# Patient Record
Sex: FEMALE | Race: AMERICAN INDIAN OR ALASKA NATIVE
[De-identification: names, ages, dates, MRNs, and addresses within clinical notes are randomized per-mention and may not be internally consistent; named-entity substitution may affect disease eponyms.]

---

## 2018-03-15 NOTE — HISTORY AND PHYSICAL REPORT
History of Present Illness


Date of examination: 03/15/18


Date of admission: 


03/15/18 19:59





Chief complaint: 





Chief complaint: Frequent falls today and painful to walk








History of present illness: 


History of present illness:


 82-year-old female with severe Parkinson's disease progressively getting 

weaker.  Unable to walk today.  Fell couple of times.  Patient complains of 

pain in the hips and the legs and the feet.  No shortness of breath.  Patient 

has severe resting tremors in both upper extremities.  No shortness of breath.  

No chest pain.  Multiple falls.  Family wants skilled nursing facility 

admission for rehabilitation and possible long-term stay.  No fever no chills.  

No recent travel.








Past Medical History


Previous Medical History?: Yes


Hx Hypertension: Yes


Additional medical history: parkinsons





Surgical History


Past Surgical History?: Yes


Additional Surgical History: fibroid tumor removal.  hysterectomy





-Social History


Smoking Status: Never Smoker


Substance Use Type: None





Medications


Home Medications: 


 


Review of Systems


ROS: 


Stated complaint: FALL PAINFUL HIPS/KNEES


Other details as noted in HPI





Comment: All other systems reviewed and negative


Constitutional: denies: chills, fever frequent falls


Eyes: denies: eye pain, eye discharge, vision change


ENT: denies: ear pain, throat pain


Respiratory: denies: cough, shortness of breath, wheezing


Cardiovascular: denies: chest pain, palpitations


Endocrine: no symptoms reported


Gastrointestinal: denies: abdominal pain, nausea, diarrhea


Genitourinary: denies: urgency, dysuria, discharge


Musculoskeletal: denies: back pain, joint swelling, arthralgia pain both hips 

knees and feet


Skin: denies: rash, lesions


Neurological: denies: headache, weakness, paresthesias severe rest tremors 

consistent with Parkinson's.


Psychiatric: denies: anxiety, depression


Hematological/Lymphatic: denies: easy bleeding, easy bruising


                    





Medications and Allergies


 Allergies











Allergy/AdvReac Type Severity Reaction Status Date / Time


 


No Known Allergies Allergy   Verified 03/15/18 22:53











 Home Medications











 Medication  Instructions  Recorded  Confirmed  Last Taken  Type


 


Atenolol/Chlorthalidone 1 each PO DAILY 03/15/18 03/15/18 Unknown History





[Atenolol-Chlorthalidone 100-25]     


 


Carbidopa/Levodopa [Carbidopa-Levo 1 tab PO QID 03/15/18 03/15/18 Unknown 

History





 mg Odt]     


 


Potassium Chloride [K-Tab ER] 10 meq PO DAILY 03/15/18 03/15/18 Unknown History


 


Primidone [Mysoline] 125 mg PO BID 03/15/18 03/15/18 Unknown History


 


Rasagiline Mesylate [Azilect] 1 mg PO QAM 03/15/18 03/15/18 Unknown History











Active Meds: 


Active Medications





Oxycodone/Acetaminophen (Percocet 5/325)  1 tab PO Q4H PRN


   PRN Reason: Pain, Moderate (4-6)


   Last Admin: 03/15/18 22:59 Dose:  1 tab











Exam





- Physical Exam


Narrative exam: 


Patient in bed with severe resting tremors.  Very tremulous








- Constitutional


Vitals: 


 











Temp Pulse Resp BP Pulse Ox


 


 97.7 F   65   18   123/43   97 


 


 03/15/18 21:51  03/15/18 21:51  03/15/18 14:54  03/15/18 21:51  03/15/18 21:51











General appearance: Present: no acute distress, mild distress, well-nourished





- EENT


Eyes: Present: PERRL


ENT: hearing intact, clear oral mucosa





- Neck


Neck: Present: supple, normal ROM





- Respiratory


Respiratory effort: normal


Respiratory: bilateral: CTA





- Cardiovascular


Heart rate: 80


Rhythm: regular (80)


Heart Sounds: Present: S1 & S2.  Absent: rub, click





- Extremities


Extremities: pulses symmetrical, No edema


Peripheral Pulses: within normal limits





- Abdominal


General gastrointestinal: Present: soft, non-tender, non-distended, normal 

bowel sounds


Female genitourinary: Present: normal





- Rectal


Rectal Exam: deferred





- Integumentary


Integumentary: Present: clear, warm, dry





- Musculoskeletal


Musculoskeletal: strength equal bilaterally, generalized weakness, other (

severe resting tremors)





- Psychiatric


Psychiatric: appropriate mood/affect, intact judgment & insight





- Neurologic


Neurologic: CNII-XII intact, moves all extremities, other (severe resting 

tremors.  Unable to walk)





- Allied Health


Allied health notes reviewed: nursing, case management





Results





- Labs


CBC & Chem 7: 


 03/15/18 15:50





 03/15/18 15:50


Labs: 


 Laboratory Last Values











WBC  11.4 K/mm3 (4.5-11.0)  H  03/15/18  15:50    


 


RBC  4.24 M/mm3 (3.65-5.03)   03/15/18  15:50    


 


Hgb  12.2 gm/dl (10.1-14.3)   03/15/18  15:50    


 


Hct  36.8 % (30.3-42.9)   03/15/18  15:50    


 


MCV  87 fl (79-97)   03/15/18  15:50    


 


MCH  29 pg (28-32)   03/15/18  15:50    


 


MCHC  33 % (30-34)   03/15/18  15:50    


 


RDW  14.2 % (13.2-15.2)   03/15/18  15:50    


 


Plt Count  254 K/mm3 (140-440)   03/15/18  15:50    


 


Lymph % (Auto)  11.3 % (13.4-35.0)  L  03/15/18  15:50    


 


Mono % (Auto)  9.9 % (0.0-7.3)  H  03/15/18  15:50    


 


Eos % (Auto)  2.2 % (0.0-4.3)   03/15/18  15:50    


 


Baso % (Auto)  0.7 % (0.0-1.8)   03/15/18  15:50    


 


Lymph #  1.3 K/mm3 (1.2-5.4)   03/15/18  15:50    


 


Mono #  1.1 K/mm3 (0.0-0.8)  H  03/15/18  15:50    


 


Eos #  0.3 K/mm3 (0.0-0.4)   03/15/18  15:50    


 


Baso #  0.1 K/mm3 (0.0-0.1)   03/15/18  15:50    


 


Seg Neutrophils %  75.9 % (40.0-70.0)  H  03/15/18  15:50    


 


Seg Neutrophils #  8.7 K/mm3 (1.8-7.7)  H  03/15/18  15:50    


 


PT  12.8 Sec. (12.2-14.9)   03/15/18  15:50    


 


INR  0.92  (0.87-1.13)   03/15/18  15:50    


 


APTT  29.2 Sec. (24.2-36.6)   03/15/18  15:50    


 


Sodium  135 mmol/L (137-145)  L  03/15/18  15:50    


 


Potassium  3.8 mmol/L (3.6-5.0)   03/15/18  15:50    


 


Chloride  91.5 mmol/L ()  L  03/15/18  15:50    


 


Carbon Dioxide  27 mmol/L (22-30)   03/15/18  15:50    


 


Anion Gap  20 mmol/L  03/15/18  15:50    


 


BUN  15 mg/dL (7-17)   03/15/18  15:50    


 


Creatinine  0.5 mg/dL (0.7-1.2)  L  03/15/18  15:50    


 


Estimated GFR  > 60 ml/min  03/15/18  15:50    


 


BUN/Creatinine Ratio  30 %  03/15/18  15:50    


 


Glucose  96 mg/dL ()   03/15/18  15:50    


 


Calcium  9.1 mg/dL (8.4-10.2)   03/15/18  15:50    


 


Total Bilirubin  0.30 mg/dL (0.1-1.2)   03/15/18  15:50    


 


Direct Bilirubin  < 0.2 mg/dL (0-0.2)   03/15/18  15:50    


 


Indirect Bilirubin  0.1 mg/dL  03/15/18  15:50    


 


AST  30 units/L (5-40)   03/15/18  15:50    


 


ALT  12 units/L (7-56)   03/15/18  15:50    


 


Alkaline Phosphatase  101 units/L ()   03/15/18  15:50    


 


Troponin T  0.036 ng/mL (0.00-0.029)  H D 03/15/18  22:53    


 


Total Protein  6.9 g/dL (6.3-8.2)   03/15/18  15:50    


 


Albumin  4.1 g/dL (3.9-5)   03/15/18  15:50    


 


Albumin/Globulin Ratio  1.5 %  03/15/18  15:50    


 


Triglycerides  62 mg/dL (2-149)   03/15/18  15:50    


 


Cholesterol  222 mg/dL ()  H  03/15/18  15:50    


 


LDL Cholesterol Direct  79 mg/dL ()   03/15/18  15:50    


 


HDL Cholesterol  153 mg/dL (40-59)  H  03/15/18  15:50    


 


Cholesterol/HDL Ratio  1.45 %  03/15/18  15:50    


 


Urine Color  Yellow  (Yellow)   03/15/18  21:11    


 


Urine Turbidity  Clear  (Clear)   03/15/18  21:11    


 


Urine pH  7.0  (5.0-7.0)   03/15/18  21:11    


 


Ur Specific Gravity  1.018  (1.003-1.030)   03/15/18  21:11    


 


Urine Protein  <15 mg/dl mg/dL (Negative)   03/15/18  21:11    


 


Urine Glucose (UA)  Neg mg/dL (Negative)   03/15/18  21:11    


 


Urine Ketones  Neg mg/dL (Negative)   03/15/18  21:11    


 


Urine Blood  Sm  (Negative)   03/15/18  21:11    


 


Urine Nitrite  Neg  (Negative)   03/15/18  21:11    


 


Urine Bilirubin  Neg  (Negative)   03/15/18  21:11    


 


Urine Urobilinogen  < 2.0 mg/dL (<2.0)   03/15/18  21:11    


 


Ur Leukocyte Esterase  Sm  (Negative)   03/15/18  21:11    


 


Urine WBC (Auto)  28.0 /HPF (0.0-6.0)  H  03/15/18  21:11    


 


Urine RBC (Auto)  5.0 /HPF (0.0-6.0)   03/15/18  21:11    


 


U Epithel Cells (Auto)  1.0 /HPF (0-13.0)   03/15/18  21:11    


 


Urine Bacteria (Auto)  2+ /HPF (Negative)   03/15/18  21:11    


 


Urine Mucus  Few /HPF  03/15/18  21:11    








 Short CBC











  03/15/18 Range/Units





  15:50 


 


WBC  11.4 H  (4.5-11.0)  K/mm3


 


Hgb  12.2  (10.1-14.3)  gm/dl


 


Hct  36.8  (30.3-42.9)  %


 


Plt Count  254  (140-440)  K/mm3








 BMP











  03/15/18





  15:50


 


Sodium  135 L


 


Potassium  3.8


 


Chloride  91.5 L


 


Carbon Dioxide  27


 


BUN  15


 


Creatinine  0.5 L


 


Glucose  96


 


Calcium  9.1








 Cardiac Enzymes











  03/15/18 03/15/18 03/15/18 Range/Units





  15:50 17:45 22:53 


 


Troponin T  0.059 H  0.055 H  0.036 H D  (0.00-0.029)  ng/mL








 Liver Function











  03/15/18 Range/Units





  15:50 


 


Total Bilirubin  0.30  (0.1-1.2)  mg/dL


 


Direct Bilirubin  < 0.2  (0-0.2)  mg/dL


 


AST  30  (5-40)  units/L


 


ALT  12  (7-56)  units/L


 


Alkaline Phosphatase  101  ()  units/L


 


Albumin  4.1  (3.9-5)  g/dL








 Urine











  03/15/18 Range/Units





  21:11 


 


Urine Color  Yellow  (Yellow)  


 


Urine pH  7.0  (5.0-7.0)  


 


Ur Specific Gravity  1.018  (1.003-1.030)  


 


Urine Protein  <15 mg/dl  (Negative)  mg/dL


 


Urine Glucose (UA)  Neg  (Negative)  mg/dL














- Imaging and Cardiology


EKG: report reviewed


Imaging and Cardiology: 


X-ray of the knee


Right fracture and slight displacement of the fibular neck





X-ray of the knee


osteopenia and degenerative changes of the knees


Fracture of the right fibular neck with remodeling which may be subacute or 

chronic.


Prepatellar soft tissue swelling and moderate joint effusions





X-ray of the toes


Posttraumatic fracture of proximal phalanx of the fifth digit right foot


Cannot exclude nondisplaced fracture of the fifth metatarsal neck








Assessment and Plan


Advance Directives: Yes (full code)


VTE prophylaxis?: Chemical


Plan of care discussed with patient/family: Yes





- Patient Problems


(1) Right fibular fracture


Current Visit: Yes   Status: Acute   


Qualifiers: 


   Encounter type: initial encounter   Fracture morphology: spiral 


Plan to address problem: 


Orthopedic consult requested.  Physical therapy if orthopedic clears.  Patient 

has frequent falls.  Needs skilled nursing facility placement








(2) Falls


Current Visit: Yes   Status: Acute   


Qualifiers: 


   Encounter type: initial encounter   Qualified Code(s): W19.XXXA - 

Unspecified fall, initial encounter   


Plan to address problem: 


Frequent falls secondary to right fibular fracture and right foot fracture








(3) Parkinson's disease


Current Visit: Yes   Status: Acute   


Plan to address problem: 


Severe Parkinson's.  Continue antiparkinson drugs








(4) Discharge planning issues


Current Visit: Yes   Status: Acute   


Plan to address problem: 


Patient needs skilled nursing facility placement.  Case 








(5) DVT prophylaxis


Current Visit: Yes   Status: Acute   


Plan to address problem: 


heparin

## 2018-03-15 NOTE — XRAY REPORT
FINAL REPORT



PROCEDURE:  XR KNEE BILAT 1-2V



TECHNIQUE:  Bilateral knee radiographs, AP and lateral views.



HISTORY:  Fall. Bilateral knee pain. 



COMPARISON:  No prior studies are available for comparison.



FINDINGS:  

Right 



Fracture (s) and/or Dislocation(s): There is bony remodeling and

slight displacement of the fibular neck.



Joint space(s): Mild lateral tibial translation. Moderate to

severe patellofemoral compartment narrowing and osteophytes. Mild

to moderate medial and lateral compartment narrowing and

osteophytes. Moderate joint effusion. 



Soft tissues: Mild prepatellar soft tissue swelling. 13 mm round

amorphous calcification about the posterior joint line. Vascular

calcification. 



Bone mineralization: Normal. 



Foreign bodies: None. 



Left 



Fracture (s) and/or Dislocation(s): Slight

irregularity/remodeling of the fibular neck. 



Joint space(s): Slight lateral tibial translation. Moderate to

severe medial and patellofemoral compartment narrowing and

osteophytes. Moderate lateral patellofemoral compartment

narrowing and osteophytes. Slight genu valgum. Moderate joint

effusion. 



Soft tissues: Mild prepatellar soft tissue swelling. Vascular

calcification. 



Bone mineralization: Osteopenia 



Foreign bodies: None. 



IMPRESSION:  

Osteopenia and degenerative changes of the knees. There is a

fracture of the right fibular neck, with remodeling may be

subacute/chronic. Similar for the to a lesser degree in the left

fibular neck, could be positioning but also consider age

indeterminate subtle fibular neck fracture. 



Prepatellar soft tissue swelling and moderate joint effusions

bilaterally. 



Amorphous calcification about the posterior joint line, consider

could be flabella. Also consider large intra-articular loose

body, or calcified cystic lesion.

## 2018-03-15 NOTE — XRAY REPORT
FINAL REPORT



PROCEDURE:  XR TOE(S) 2+V RT



TECHNIQUE:  RIGHT 5th toe radiographs, including AP, oblique and

lateral views.



HISTORY:  Right 5th toe pain. 



COMPARISON:  No prior studies are available for comparison.



FINDINGS:  

Fracture(s) and/or Dislocation(s): Transverse lucency through the

proximal phalanx of the 5th digit. Minimal angulation and

irregularity at the neck of the 5th metatarsal. 



Joint space(s): Flexion of the 2nd-5th digits, particularly 4th

and 5th digits, limits evaluation. Mild diffuse narrowing of the

distal interphalangeal joints, 1st metatarsophalangeal joint, and

the intertarsal joints. 



Soft tissues: Normal.



Bone mineralization: Moderate osteopenia.



Foreign bodies: None.



IMPRESSION:  

Moderate osteopenia and flexion of the digits limits evaluation.

Findings concerning for minimally displaced posttraumatic

fracture of the proximal phalanx of the 5th digit. Cannot exclude

nondisplaced fracture of the 5th metatarsal neck.

## 2018-03-15 NOTE — EMERGENCY DEPARTMENT REPORT
- General


Chief complaint: Fall


Stated complaint: FALL PAINFUL HIPS/KNEES


Time Seen by Provider: 03/15/18 16:30


Source: patient, family


Mode of arrival: Wheelchair


Limitations: No Limitations





- Related Data


 Home Medications











 Medication  Instructions  Recorded  Confirmed  Last Taken


 


No Known Home Medications [No  03/10/14 03/10/14 Unknown





Reported Home Medications]    











 Allergies











Allergy/AdvReac Type Severity Reaction Status Date / Time


 


No Known Allergies Allergy   Unverified 03/10/14 14:52














ED Review of Systems


ROS: 


Stated complaint: FALL PAINFUL HIPS/KNEES


Other details as noted in HPI





Comment: All other systems reviewed and negative


Constitutional: denies: chills, fever


Eyes: denies: eye pain, eye discharge, vision change


ENT: denies: ear pain, throat pain


Respiratory: denies: cough, shortness of breath, wheezing


Cardiovascular: denies: chest pain, palpitations


Endocrine: no symptoms reported


Gastrointestinal: denies: abdominal pain, nausea, diarrhea


Genitourinary: denies: urgency, dysuria, discharge


Musculoskeletal: denies: back pain, joint swelling, arthralgia


Skin: denies: rash, lesions


Neurological: denies: headache, weakness, paresthesias


Psychiatric: denies: anxiety, depression


Hematological/Lymphatic: denies: easy bleeding, easy bruising





ED Past Medical Hx





- Past Medical History


Previous Medical History?: Yes


Hx Hypertension: Yes


Additional medical history: parkinsons





- Surgical History


Past Surgical History?: Yes


Additional Surgical History: fibroid tumor removal.  hysterectomy





- Social History


Smoking Status: Never Smoker


Substance Use Type: None





- Medications


Home Medications: 


 Home Medications











 Medication  Instructions  Recorded  Confirmed  Last Taken  Type


 


No Known Home Medications [No  03/10/14 03/10/14 Unknown History





Reported Home Medications]     














ED Physical Exam





- General


Limitations: No Limitations


General appearance: alert, in no apparent distress





- Head


Head exam: Present: atraumatic, normocephalic





- Eye


Eye exam: Present: normal appearance





- ENT


ENT exam: Present: mucous membranes moist





- Neck


Neck exam: Present: normal inspection





- Respiratory


Respiratory exam: Present: normal lung sounds bilaterally.  Absent: respiratory 

distress





- Cardiovascular


Cardiovascular Exam: Present: regular rate, normal rhythm.  Absent: systolic 

murmur, diastolic murmur, rubs, gallop





- GI/Abdominal


GI/Abdominal exam: Present: soft, normal bowel sounds





- Extremities Exam


Extremities exam: Present: tenderness (bilateral lower extremity tenderness), 

pedal edema, joint swelling





- Back Exam


Back exam: Present: normal inspection





- Neurological Exam


Neurological exam: Present: alert, oriented X3





- Psychiatric


Psychiatric exam: Present: normal affect, normal mood





- Skin


Skin exam: Present: warm, dry, intact, normal color.  Absent: rash





ED Course


 Vital Signs











  03/15/18 03/15/18 03/15/18





  14:54 19:32 19:45


 


Temperature 98.1 F 98.0 F 


 


Pulse Rate 60  


 


Respiratory 18  





Rate   


 


Blood Pressure 110/47  


 


Blood Pressure   130/56





[Left]   


 


O2 Sat by Pulse 99  





Oximetry   














- Reevaluation(s)


Reevaluation #1: 


Hospitalist agreed to admit.


03/15/18 18:58








ED Medical Decision Making





- Lab Data


Result diagrams: 


 03/15/18 15:50





 03/15/18 15:50





- EKG Data


Interpretation: other (EKG with much artifact due to the patient having history 

of Parkinson's disease)





- Medical Decision Making


All results reviewed with patient and family.  Will admit patient for further 

evaluation and treatment.  Hospitalist consulted and agreed to admit








- Differential Diagnosis


falls. fractures. weakness. 


Critical care attestation.: 


If time is entered above; I have spent that time in minutes in the direct care 

of this critically ill patient, excluding procedure time.








ED Disposition


Clinical Impression: 


 Weakness, Left fibular fracture, Right fibular fracture, Falls





Disposition: [id number] OP ADMIT IP TO THIS HOSP


Is pt being admited?: Yes


Does the pt Need Aspirin: No


Condition: Serious


Time of Disposition: 18:53

## 2018-03-16 NOTE — PROGRESS NOTE
Assessment and Plan


Assessment and plan: 


82-year-old female with severe Parkinson's disease progressively getting 

weaker.  Unable to walk today.  Fell couple of times.  Patient complains of 

pain in the hips and the legs and the feet.  No shortness of breath.  Patient 

has severe resting tremors in both upper extremities.  No shortness of breath.  

No chest pain.  Multiple falls.  Family wants skilled nursing facility 

admission for rehabilitation and possible long-term stay.  No fever no chills.  

No recent travel.








Right fibular fracture secondary to fall


Presumed osteoporosis


Falls


Parkinson disease


HTN


Acute cystitis


Advanced Age


Tropenemia- no chest pain.





Plan


* Ortho consulted, PT/OT following ortho eval, Noted frequent falls, may need 

SNF, case management to assist


* Start  calcium and vitamin D


* Continue chlorothalidone


* Await cardiology consultation


* I'll start the patient on antibiotics and obtain urine culture no evidence of 

sepsis at this time


* Fall precautions while in house


* Continue anti-Parkinson medications.


* DVT and GI prophylaxis














History


Interval history: 


Patient seen and examined, in no acute distress. tolerating diet. 








Hospitalist Physical





- Physical exam


Narrative exam: 





General appearance: Present: no acute distress, mild distress, well-nourished, 

resting tremor





- EENT


Eyes: Present: PERRL


ENT: hearing intact, clear oral mucosa





- Neck


Neck: Present: supple, normal ROM





- Respiratory


Respiratory effort: normal


Respiratory: bilateral: CTA





- Cardiovascular


Heart rate: 80


Rhythm: regular (80)


Heart Sounds: Present: S1 & S2.  Absent: rub, click





- Extremities


Extremities: pulses symmetrical, No edema


Peripheral Pulses: within normal limits





- Abdominal


General gastrointestinal: Present: soft, non-tender, non-distended, normal 

bowel sounds


Female genitourinary: Present: normal





- Rectal


Rectal Exam: deferred





- Integumentary


Integumentary: Present: clear, warm, dry





- Musculoskeletal


Musculoskeletal: strength equal bilaterally, generalized weakness, other (

severe resting tremors)





- Psychiatric


Psychiatric: appropriate mood/affect, intact judgment & insight





- Neurologic


Neurologic: CNII-XII intact, moves all extremities, other (severe resting 

tremors.  Unable to walk)





- Allied Health


Allied health notes reviewed: nursing, case management








- Constitutional


Vitals: 


 











Temp Pulse Resp BP Pulse Ox


 


 98.6 F   71   18   130/81   98 


 


 03/16/18 05:46  03/16/18 05:46  03/15/18 14:54  03/16/18 05:46  03/16/18 05:46











General appearance: Present: no acute distress, mild distress, well-nourished





Results





- Labs


CBC & Chem 7: 


 03/15/18 15:50





 03/15/18 15:50


Labs: 


 Laboratory Last Values











WBC  11.4 K/mm3 (4.5-11.0)  H  03/15/18  15:50    


 


RBC  4.24 M/mm3 (3.65-5.03)   03/15/18  15:50    


 


Hgb  12.2 gm/dl (10.1-14.3)   03/15/18  15:50    


 


Hct  36.8 % (30.3-42.9)   03/15/18  15:50    


 


MCV  87 fl (79-97)   03/15/18  15:50    


 


MCH  29 pg (28-32)   03/15/18  15:50    


 


MCHC  33 % (30-34)   03/15/18  15:50    


 


RDW  14.2 % (13.2-15.2)   03/15/18  15:50    


 


Plt Count  254 K/mm3 (140-440)   03/15/18  15:50    


 


Lymph % (Auto)  11.3 % (13.4-35.0)  L  03/15/18  15:50    


 


Mono % (Auto)  9.9 % (0.0-7.3)  H  03/15/18  15:50    


 


Eos % (Auto)  2.2 % (0.0-4.3)   03/15/18  15:50    


 


Baso % (Auto)  0.7 % (0.0-1.8)   03/15/18  15:50    


 


Lymph #  1.3 K/mm3 (1.2-5.4)   03/15/18  15:50    


 


Mono #  1.1 K/mm3 (0.0-0.8)  H  03/15/18  15:50    


 


Eos #  0.3 K/mm3 (0.0-0.4)   03/15/18  15:50    


 


Baso #  0.1 K/mm3 (0.0-0.1)   03/15/18  15:50    


 


Seg Neutrophils %  75.9 % (40.0-70.0)  H  03/15/18  15:50    


 


Seg Neutrophils #  8.7 K/mm3 (1.8-7.7)  H  03/15/18  15:50    


 


PT  12.8 Sec. (12.2-14.9)   03/15/18  15:50    


 


INR  0.92  (0.87-1.13)   03/15/18  15:50    


 


APTT  29.2 Sec. (24.2-36.6)   03/15/18  15:50    


 


Sodium  135 mmol/L (137-145)  L  03/15/18  15:50    


 


Potassium  3.8 mmol/L (3.6-5.0)   03/15/18  15:50    


 


Chloride  91.5 mmol/L ()  L  03/15/18  15:50    


 


Carbon Dioxide  27 mmol/L (22-30)   03/15/18  15:50    


 


Anion Gap  20 mmol/L  03/15/18  15:50    


 


BUN  15 mg/dL (7-17)   03/15/18  15:50    


 


Creatinine  0.5 mg/dL (0.7-1.2)  L  03/15/18  15:50    


 


Estimated GFR  > 60 ml/min  03/15/18  15:50    


 


BUN/Creatinine Ratio  30 %  03/15/18  15:50    


 


Glucose  96 mg/dL ()   03/15/18  15:50    


 


Calcium  9.1 mg/dL (8.4-10.2)   03/15/18  15:50    


 


Total Bilirubin  0.30 mg/dL (0.1-1.2)   03/15/18  15:50    


 


Direct Bilirubin  < 0.2 mg/dL (0-0.2)   03/15/18  15:50    


 


Indirect Bilirubin  0.1 mg/dL  03/15/18  15:50    


 


AST  30 units/L (5-40)   03/15/18  15:50    


 


ALT  12 units/L (7-56)   03/15/18  15:50    


 


Alkaline Phosphatase  101 units/L ()   03/15/18  15:50    


 


Troponin T  0.036 ng/mL (0.00-0.029)  H D 03/15/18  22:53    


 


Total Protein  6.9 g/dL (6.3-8.2)   03/15/18  15:50    


 


Albumin  4.1 g/dL (3.9-5)   03/15/18  15:50    


 


Albumin/Globulin Ratio  1.5 %  03/15/18  15:50    


 


Triglycerides  62 mg/dL (2-149)   03/15/18  15:50    


 


Cholesterol  222 mg/dL ()  H  03/15/18  15:50    


 


LDL Cholesterol Direct  79 mg/dL ()   03/15/18  15:50    


 


HDL Cholesterol  153 mg/dL (40-59)  H  03/15/18  15:50    


 


Cholesterol/HDL Ratio  1.45 %  03/15/18  15:50    


 


Urine Color  Yellow  (Yellow)   03/15/18  21:11    


 


Urine Turbidity  Clear  (Clear)   03/15/18  21:11    


 


Urine pH  7.0  (5.0-7.0)   03/15/18  21:11    


 


Ur Specific Gravity  1.018  (1.003-1.030)   03/15/18  21:11    


 


Urine Protein  <15 mg/dl mg/dL (Negative)   03/15/18  21:11    


 


Urine Glucose (UA)  Neg mg/dL (Negative)   03/15/18  21:11    


 


Urine Ketones  Neg mg/dL (Negative)   03/15/18  21:11    


 


Urine Blood  Sm  (Negative)   03/15/18  21:11    


 


Urine Nitrite  Neg  (Negative)   03/15/18  21:11    


 


Urine Bilirubin  Neg  (Negative)   03/15/18  21:11    


 


Urine Urobilinogen  < 2.0 mg/dL (<2.0)   03/15/18  21:11    


 


Ur Leukocyte Esterase  Sm  (Negative)   03/15/18  21:11    


 


Urine WBC (Auto)  28.0 /HPF (0.0-6.0)  H  03/15/18  21:11    


 


Urine RBC (Auto)  5.0 /HPF (0.0-6.0)   03/15/18  21:11    


 


U Epithel Cells (Auto)  1.0 /HPF (0-13.0)   03/15/18  21:11    


 


Urine Bacteria (Auto)  2+ /HPF (Negative)   03/15/18  21:11    


 


Urine Mucus  Few /HPF  03/15/18  21:11

## 2018-03-16 NOTE — CAT SCAN REPORT
FINAL REPORT



EXAM:  CT HEAD/BRAIN WO CON



HISTORY:  weakness 



TECHNIQUE:  



Routine axial imaging was obtained of the brain without IV

contrast



FINDINGS:  





There is mild generalized volume loss. There is no evidence of

acute stroke or hemorrhage. The ventricular system is appropriate

in size and is symmetric. The basal cisterns appear normal. The

visualized sinuses are clear. The mastoid air cells are well

pneumatized 



IMPRESSION:  

 Age related volume loss. No evidence of acute stroke or

hemorrhage

## 2018-03-16 NOTE — CONSULTATION
History of Present Illness


Consult date: 03/16/18


Consult reason: elevated troponin


History of present illness: 





This is an 82yr old woman who presented following a fall. Patient has a history 

of Parkinson's disease and uses a walker. On yesterday, patient she got up, 

lost her balance and fell. Patient denies chest pain, shortness of breath, 

dizziness and palpitations. Patient denies syncope. 





A cardiac consultation was requested for elevated troponin of 0.059. 12 lead ECG

, non-diagnostic due to motion artifact. Telemetry strips shows a sinus rhythm 

with occasional PVCs. 





Medications and Allergies


 Allergies











Allergy/AdvReac Type Severity Reaction Status Date / Time


 


No Known Allergies Allergy   Verified 03/15/18 22:53











 Home Medications











 Medication  Instructions  Recorded  Confirmed  Last Taken  Type


 


Atenolol/Chlorthalidone 1 each PO DAILY 03/15/18 03/15/18 Unknown History





[Atenolol-Chlorthalidone 100-25]     


 


Carbidopa/Levodopa [Carbidopa-Levo 1 tab PO QID 03/15/18 03/15/18 Unknown 

History





 mg Odt]     


 


Potassium Chloride [K-Tab ER] 10 meq PO DAILY 03/15/18 03/15/18 Unknown History


 


Primidone [Mysoline] 125 mg PO BID 03/15/18 03/15/18 Unknown History


 


Rasagiline Mesylate [Azilect] 1 mg PO QAM 03/15/18 03/15/18 Unknown History











Active Meds: 


Active Medications





Atenolol (Tenormin)  100 mg PO QDAY [facility]


Carbidopa/Levodopa (Sinemet)  1 each PO QID [facility]


Chlorthalidone (Thalitone)  25 mg PO QDAY [facility]


Hydromorphone HCl (Dilaudid)  0.25 mg IV Q6H PRN


   PRN Reason: Pain , Severe (7-10)


   Last Admin: 03/16/18 09:21 Dose:  0.25 mg


Ceftriaxone Sodium 1 gm/ (Sodium Chloride)  20 mls @ 20 mls/10 min IV Q24HR [facility]

; Protocol


Miscellaneous Medication (Rasagiline Mesylate [Azilect])  1 mg PO QAM [facility]


Multivitamins/Minerals (Caltrate Plus)  1 each PO BID [facility]


Oxycodone/Acetaminophen (Percocet 5/325)  1 tab PO Q4H PRN


   PRN Reason: Pain, Moderate (4-6)


   Last Admin: 03/16/18 05:57 Dose:  1 tab


Potassium Chloride (K-Dur)  10 meq PO QDAY [facility]


Primidone (Mysoline)  125 mg PO BID [facility]


   Last Admin: 03/16/18 00:57 Dose:  125 mg











Physical Examination


 Vital Signs











Temp Pulse Resp BP Pulse Ox


 


 98.1 F   60   18   110/47   99 


 


 03/15/18 14:54  03/15/18 14:54  03/15/18 14:54  03/15/18 14:54  03/15/18 14:54











General appearance: no acute distress


HEENT: Positive: PERRL


Cardiac: Positive: Reg Rate and Rhythm


Lungs: Positive: Decreased Breath Sounds


Neuro: Positive: Weakness





Results





 03/15/18 15:50





 03/15/18 15:50


 Cardiac Enzymes











  03/15/18 Range/Units





  15:50 


 


AST  30  (5-40)  units/L








 Coagulation











  03/15/18 Range/Units





  15:50 


 


PT  12.8  (12.2-14.9)  Sec.


 


INR  0.92  (0.87-1.13)  


 


APTT  29.2  (24.2-36.6)  Sec.








 Lipids











  03/15/18 Range/Units





  15:50 


 


Triglycerides  62  (2-149)  mg/dL


 


Cholesterol  222 H  ()  mg/dL


 


HDL Cholesterol  153 H  (40-59)  mg/dL


 


Cholesterol/HDL Ratio  1.45  %








 CBC











  03/15/18 Range/Units





  15:50 


 


WBC  11.4 H  (4.5-11.0)  K/mm3


 


RBC  4.24  (3.65-5.03)  M/mm3


 


Hgb  12.2  (10.1-14.3)  gm/dl


 


Hct  36.8  (30.3-42.9)  %


 


Plt Count  254  (140-440)  K/mm3


 


Lymph #  1.3  (1.2-5.4)  K/mm3


 


Mono #  1.1 H  (0.0-0.8)  K/mm3


 


Eos #  0.3  (0.0-0.4)  K/mm3


 


Baso #  0.1  (0.0-0.1)  K/mm3








 Comprehensive Metabolic Panel











  03/15/18 03/15/18 Range/Units





  15:50 15:50 


 


Sodium  135 L   (137-145)  mmol/L


 


Potassium  3.8   (3.6-5.0)  mmol/L


 


Chloride  91.5 L   ()  mmol/L


 


Carbon Dioxide  27   (22-30)  mmol/L


 


BUN  15   (7-17)  mg/dL


 


Creatinine  0.5 L   (0.7-1.2)  mg/dL


 


Glucose  96   ()  mg/dL


 


Calcium  9.1   (8.4-10.2)  mg/dL


 


Direct Bilirubin   < 0.2  (0-0.2)  mg/dL


 


Indirect Bilirubin   0.1  mg/dL


 


AST   30  (5-40)  units/L


 


ALT   12  (7-56)  units/L


 


Alkaline Phosphatase   101  ()  units/L


 


Total Protein   6.9  (6.3-8.2)  g/dL


 


Albumin   4.1  (3.9-5)  g/dL














Assessment and Plan





s/p Fall


   no acute process on head CT


Parkinson's disease


Hypertension


Mild elevated troponin

## 2018-03-16 NOTE — CONSULTATION
History of Present Illness





- \[facility] [place] [facility]\""


Consult date: 03/16/18


Consult reason: joint pain


History of present illness: 





81 y/o female with hx of Parkinson's, and hx of frequent falls, xrays taken 

upon admission suggestive of fx age undetermined...





Medications and Allergies


 Allergies











Allergy/AdvReac Type Severity Reaction Status Date / Time


 


No Known Allergies Allergy   Verified 03/15/18 22:53











 Home Medications











 Medication  Instructions  Recorded  Confirmed  Last Taken  Type


 


Atenolol/Chlorthalidone 1 each PO DAILY 03/15/18 03/15/18 Unknown History





[Atenolol-Chlorthalidone 100-25]     


 


Carbidopa/Levodopa [Carbidopa-Levo 1 tab PO QID 03/15/18 03/15/18 Unknown 

History





 mg Odt]     


 


Potassium Chloride [K-Tab ER] 10 meq PO DAILY 03/15/18 03/15/18 Unknown History


 


Primidone [Mysoline] 125 mg PO BID 03/15/18 03/15/18 Unknown History


 


Rasagiline Mesylate [Azilect] 1 mg PO QAM 03/15/18 03/15/18 Unknown History











Active Meds: 


Active Medications





Atenolol (Tenormin)  100 mg PO QDAY [facility]


   Last Admin: 03/16/18 12:54 Dose:  Not Given


Carbidopa/Levodopa (Sinemet)  1 each PO QID [facility]


   Last Admin: 03/16/18 12:59 Dose:  1 each


Chlorthalidone (Thalitone)  25 mg PO QDAY [facility]


   Last Admin: 03/16/18 12:52 Dose:  25 mg


Hydromorphone HCl (Dilaudid)  0.25 mg IV Q4H PRN


   PRN Reason: Pain , Severe (7-10)


Ceftriaxone Sodium 1 gm/ (Sodium Chloride)  20 mls @ 20 mls/10 min IV Q24HR [facility]

; Protocol


   Last Admin: 03/16/18 10:24 Dose:  20 mls/10 min


Miscellaneous Medication (Rasagiline Mesylate [Azilect])  1 mg PO QAM [facility]


Multivitamins/Minerals (Caltrate Plus)  1 each PO BID [facility]


   Last Admin: 03/16/18 12:51 Dose:  1 each


Oxycodone/Acetaminophen (Percocet 5/325)  1 tab PO Q4H PRN


   PRN Reason: Pain, Moderate (4-6)


   Last Admin: 03/16/18 12:53 Dose:  1 tab


Potassium Chloride (K-Dur)  10 meq PO QDAY [facility]


   Last Admin: 03/16/18 12:52 Dose:  10 meq


Primidone (Mysoline)  125 mg PO BID [facility]


   Last Admin: 03/16/18 12:51 Dose:  125 mg











Physical Examination





- Physical exam


Narrative exam: 





xrays reviewed and reveal old healed fx's to both proximal fibulas, no recent 

injury detected





Assessment and Plan





bilateral lower extremity pain, no evidence of new injury


recommend PT for gait training

## 2018-03-18 NOTE — PROGRESS NOTE
Assessment and Plan


Assessment and plan: 





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


Presumed osteoporosis


Falls


Parkinson disease


HTN


Acute cystitis


Advanced Age


Tropenemia- no chest pain.





Plan


* Ortho reviewed xrays and reveal old healed fx's to both proximal fibulas, no 

recent injury detected


* Contcalcium and vitamin D


* Continue chlorothalidone


* cont. abx, urine cx neg, f/u blood cx


* Cardiology recommends no further workup--Troponin trend is non-specific  Echo 

08/2017 - normal LVEF


* PT recommends SNF


* Fall precautions while in house


* Continue anti-Parkinson medications.


* DVT and GI prophylaxis


* Await SNF placement





- Patient Problems


(1) Falls


Current Visit: Yes   Status: Acute   


Qualifiers: 


   Encounter type: initial encounter   Qualified Code(s): W19.XXXA - 

Unspecified fall, initial encounter   





(2) Parkinson's disease


Current Visit: Yes   Status: Acute   





(3) Weakness


Current Visit: Yes   Status: Acute   





History


Interval history: 





No new issues overnight.





Hospitalist Physical





- Constitutional


Vitals: 


 











Temp Pulse Resp BP Pulse Ox


 


 98.6 F   74   20   123/52   93 


 


 03/18/18 04:59  03/18/18 09:49  03/18/18 06:47  03/18/18 04:59  03/18/18 04:59











General appearance: Present: no acute distress, well-nourished





- EENT


Eyes: Present: PERRL, EOM intact


ENT: hearing intact, clear oral mucosa, dentition normal





- Neck


Neck: Present: supple, normal ROM





- Respiratory


Respiratory effort: normal


Respiratory: bilateral: CTA





- Cardiovascular


Rhythm: regular


Heart Sounds: Present: S1 & S2.  Absent: gallop, rub





- Extremities


Extremities: no ischemia, No edema, Full ROM





- Abdominal


General gastrointestinal: soft, non-tender, non-distended, normal bowel sounds





- Integumentary


Integumentary: Present: clear, warm, dry





- Neurologic


Neurologic: CNII-XII intact, moves all extremities





Results





- Labs


CBC & Chem 7: 


 03/17/18 03:31





 03/17/18 03:31


Labs: 


 Laboratory Last Values











WBC  7.8 K/mm3 (4.5-11.0)   03/17/18  03:31    


 


RBC  3.80 M/mm3 (3.65-5.03)   03/17/18  03:31    


 


Hgb  11.0 gm/dl (10.1-14.3)   03/17/18  03:31    


 


Hct  33.1 % (30.3-42.9)   03/17/18  03:31    


 


MCV  87 fl (79-97)   03/17/18  03:31    


 


MCH  29 pg (28-32)   03/17/18  03:31    


 


MCHC  33 % (30-34)   03/17/18  03:31    


 


RDW  14.3 % (13.2-15.2)   03/17/18  03:31    


 


Plt Count  209 K/mm3 (140-440)   03/17/18  03:31    


 


Lymph % (Auto)  11.3 % (13.4-35.0)  L  03/15/18  15:50    


 


Mono % (Auto)  9.9 % (0.0-7.3)  H  03/15/18  15:50    


 


Eos % (Auto)  2.2 % (0.0-4.3)   03/15/18  15:50    


 


Baso % (Auto)  0.7 % (0.0-1.8)   03/15/18  15:50    


 


Lymph #  1.3 K/mm3 (1.2-5.4)   03/15/18  15:50    


 


Mono #  1.1 K/mm3 (0.0-0.8)  H  03/15/18  15:50    


 


Eos #  0.3 K/mm3 (0.0-0.4)   03/15/18  15:50    


 


Baso #  0.1 K/mm3 (0.0-0.1)   03/15/18  15:50    


 


Seg Neutrophils %  75.9 % (40.0-70.0)  H  03/15/18  15:50    


 


Seg Neutrophils #  8.7 K/mm3 (1.8-7.7)  H  03/15/18  15:50    


 


PT  12.8 Sec. (12.2-14.9)   03/15/18  15:50    


 


INR  0.92  (0.87-1.13)   03/15/18  15:50    


 


APTT  29.2 Sec. (24.2-36.6)   03/15/18  15:50    


 


Sodium  136 mmol/L (137-145)  L  03/17/18  03:31    


 


Potassium  4.1 mmol/L (3.6-5.0)   03/17/18  03:31    


 


Chloride  95.1 mmol/L ()  L  03/17/18  03:31    


 


Carbon Dioxide  29 mmol/L (22-30)   03/17/18  03:31    


 


Anion Gap  16 mmol/L  03/17/18  03:31    


 


BUN  12 mg/dL (7-17)   03/17/18  03:31    


 


Creatinine  0.6 mg/dL (0.7-1.2)  L  03/17/18  03:31    


 


Estimated GFR  > 60 ml/min  03/17/18  03:31    


 


BUN/Creatinine Ratio  20 %  03/17/18  03:31    


 


Glucose  94 mg/dL ()   03/17/18  03:31    


 


POC Glucose  144  ()  H  03/18/18  04:53    


 


Calcium  8.5 mg/dL (8.4-10.2)   03/17/18  03:31    


 


Total Bilirubin  0.30 mg/dL (0.1-1.2)   03/15/18  15:50    


 


Direct Bilirubin  < 0.2 mg/dL (0-0.2)   03/15/18  15:50    


 


Indirect Bilirubin  0.1 mg/dL  03/15/18  15:50    


 


AST  30 units/L (5-40)   03/15/18  15:50    


 


ALT  12 units/L (7-56)   03/15/18  15:50    


 


Alkaline Phosphatase  101 units/L ()   03/15/18  15:50    


 


Troponin T  0.036 ng/mL (0.00-0.029)  H D 03/15/18  22:53    


 


Total Protein  6.9 g/dL (6.3-8.2)   03/15/18  15:50    


 


Albumin  4.1 g/dL (3.9-5)   03/15/18  15:50    


 


Albumin/Globulin Ratio  1.5 %  03/15/18  15:50    


 


Triglycerides  62 mg/dL (2-149)   03/15/18  15:50    


 


Cholesterol  222 mg/dL ()  H  03/15/18  15:50    


 


LDL Cholesterol Direct  79 mg/dL ()   03/15/18  15:50    


 


HDL Cholesterol  153 mg/dL (40-59)  H  03/15/18  15:50    


 


Cholesterol/HDL Ratio  1.45 %  03/15/18  15:50    


 


Urine Color  Yellow  (Yellow)   03/15/18  21:11    


 


Urine Turbidity  Clear  (Clear)   03/15/18  21:11    


 


Urine pH  7.0  (5.0-7.0)   03/15/18  21:11    


 


Ur Specific Gravity  1.018  (1.003-1.030)   03/15/18  21:11    


 


Urine Protein  <15 mg/dl mg/dL (Negative)   03/15/18  21:11    


 


Urine Glucose (UA)  Neg mg/dL (Negative)   03/15/18  21:11    


 


Urine Ketones  Neg mg/dL (Negative)   03/15/18  21:11    


 


Urine Blood  Sm  (Negative)   03/15/18  21:11    


 


Urine Nitrite  Neg  (Negative)   03/15/18  21:11    


 


Urine Bilirubin  Neg  (Negative)   03/15/18  21:11    


 


Urine Urobilinogen  < 2.0 mg/dL (<2.0)   03/15/18  21:11    


 


Ur Leukocyte Esterase  Sm  (Negative)   03/15/18  21:11    


 


Urine WBC (Auto)  28.0 /HPF (0.0-6.0)  H  03/15/18  21:11    


 


Urine RBC (Auto)  5.0 /HPF (0.0-6.0)   03/15/18  21:11    


 


U Epithel Cells (Auto)  1.0 /HPF (0-13.0)   03/15/18  21:11    


 


Urine Bacteria (Auto)  2+ /HPF (Negative)   03/15/18  21:11    


 


Urine Mucus  Few /HPF  03/15/18  21:11

## 2018-03-18 NOTE — PROGRESS NOTE
Assessment and Plan





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


Presumed osteoporosis


Falls


Parkinson disease


HTN


Acute cystitis


Advanced Age


Tropenemia- no chest pain.





Plan


* Ortho reviewed xrays and reveal old healed fx's to both proximal fibulas, no 

recent injury detected


* Contcalcium and vitamin D


* Continue chlorothalidone


* cont. abx, urine cx neg, f/u blood cx


* Cardiology recommends no further workup--Troponin trend is non-specific  Echo 

08/2017 - normal LVEF


* PT recommends SNF


* Fall precautions while in house


* Continue anti-Parkinson medications.


* DVT and GI prophylaxis


* Await SNF placement





- Patient Problems


(1) Falls


Current Visit: Yes   Status: Acute   


Qualifiers: 


   Encounter type: initial encounter   Qualified Code(s): W19.XXXA - 

Unspecified fall, initial encounter   





(2) Parkinson's disease


Current Visit: Yes   Status: Acute   





(3) Weakness


Current Visit: Yes   Status: Acute   





Subjective


Date of service: 03/17/18


Interval history: 





No new issues overnight.





Objective





- Constitutional


Vitals: 


 Vital Signs - 12hr











  03/17/18 03/17/18 03/18/18





  22:00 23:09 00:09


 


Temperature   


 


Pulse Rate 78  


 


Respiratory  20 20





Rate   


 


Respiratory 18  





Rate [Knee]   


 


Blood Pressure   


 


O2 Sat by Pulse 99  





Oximetry   














  03/18/18 03/18/18 03/18/18





  04:59 06:47 09:49


 


Temperature 98.6 F  


 


Pulse Rate 74  74


 


Respiratory 20 20 





Rate   


 


Respiratory   





Rate [Knee]   


 


Blood Pressure 123/52  


 


O2 Sat by Pulse 93  





Oximetry   














  03/18/18





  09:50


 


Temperature 


 


Pulse Rate 


 


Respiratory 18





Rate 


 


Respiratory 





Rate [Knee] 


 


Blood Pressure 


 


O2 Sat by Pulse 98





Oximetry 











General appearance: Present: no acute distress, well-nourished





- EENT


Eyes: PERRL, EOM intact


ENT: hearing intact, clear oral mucosa


Ears: bilateral: normal





- Neck


Neck: supple, normal ROM





- Respiratory


Respiratory effort: normal


Respiratory: bilateral: CTA





- Breasts


Breasts: normal





- Cardiovascular


Rhythm: regular


Heart Sounds: Present: S1 & S2.  Absent: gallop, rub


Extremities: pulses intact, No edema, normal color, Full ROM





- Gastrointestinal


General gastrointestinal: Present: soft, non-tender, non-distended, normal 

bowel sounds





- Genitourinary


Female genitourinary: normal





- Integumentary


Integumentary: clear, warm, dry





- Musculoskeletal


Musculoskeletal: 1, strength equal bilaterally





- Neurologic


Neurologic: moves all extremities





- Psychiatric


Psychiatric: memory intact, appropriate mood/affect, intact judgment & insight





- Labs


CBC & Chem 7: 


 03/17/18 03:31





 03/17/18 03:31


Labs: 


 Abnormal lab results











  03/18/18 Range/Units





  04:53 


 


POC Glucose  144 H  ()

## 2018-03-19 NOTE — PROGRESS NOTE
Assessment and Plan


Assessment and plan: 





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


Presumed osteoporosis


Falls


Parkinson disease


HTN


Acute cystitis


Advanced Age


Tropenemia- no chest pain.





Plan


* Ortho reviewed xrays and reveal old healed fx's to both proximal fibulas, no 

recent injury detected


* Contcalcium and vitamin D


* Continue chlorothalidone


* cont. abx, urine cx neg, f/u blood cx


* Cardiology recommends no further workup--Troponin trend is non-specific  Echo 

08/2017 - normal LVEF


* PT recommends SNF


* Fall precautions while in house


* Continue anti-Parkinson medications.


* DVT and GI prophylaxis


* Await SNF placement





- Patient Problems


(1) Falls


Current Visit: Yes   Status: Acute   


Qualifiers: 


   Encounter type: initial encounter   Qualified Code(s): W19.XXXA - 

Unspecified fall, initial encounter   





(2) Parkinson's disease


Current Visit: Yes   Status: Acute   





(3) Weakness


Current Visit: Yes   Status: Acute   





History


Interval history: 





No new issues overnight.





Hospitalist Physical





- Constitutional


Vitals: 


 











Temp Pulse Resp BP Pulse Ox


 


 98.2 F   73   16   123/58   96 


 


 03/19/18 07:53  03/19/18 07:53  03/19/18 07:53  03/19/18 07:53  03/19/18 07:53











General appearance: Present: no acute distress, well-nourished





- EENT


Eyes: Present: PERRL, EOM intact


ENT: hearing intact, clear oral mucosa, dentition normal





- Neck


Neck: Present: supple, normal ROM





- Respiratory


Respiratory effort: normal


Respiratory: bilateral: CTA





- Cardiovascular


Rhythm: regular


Heart Sounds: Present: S1 & S2.  Absent: gallop, rub





- Extremities


Extremities: no ischemia, No edema, Full ROM





- Abdominal


General gastrointestinal: soft, non-tender, non-distended, normal bowel sounds





- Integumentary


Integumentary: Present: clear, warm, dry





- Neurologic


Neurologic: CNII-XII intact, moves all extremities





Results





- Labs


CBC & Chem 7: 


 03/17/18 03:31





 03/17/18 03:31


Labs: 


 Laboratory Last Values











WBC  7.8 K/mm3 (4.5-11.0)   03/17/18  03:31    


 


RBC  3.80 M/mm3 (3.65-5.03)   03/17/18  03:31    


 


Hgb  11.0 gm/dl (10.1-14.3)   03/17/18  03:31    


 


Hct  33.1 % (30.3-42.9)   03/17/18  03:31    


 


MCV  87 fl (79-97)   03/17/18  03:31    


 


MCH  29 pg (28-32)   03/17/18  03:31    


 


MCHC  33 % (30-34)   03/17/18  03:31    


 


RDW  14.3 % (13.2-15.2)   03/17/18  03:31    


 


Plt Count  209 K/mm3 (140-440)   03/17/18  03:31    


 


Lymph % (Auto)  11.3 % (13.4-35.0)  L  03/15/18  15:50    


 


Mono % (Auto)  9.9 % (0.0-7.3)  H  03/15/18  15:50    


 


Eos % (Auto)  2.2 % (0.0-4.3)   03/15/18  15:50    


 


Baso % (Auto)  0.7 % (0.0-1.8)   03/15/18  15:50    


 


Lymph #  1.3 K/mm3 (1.2-5.4)   03/15/18  15:50    


 


Mono #  1.1 K/mm3 (0.0-0.8)  H  03/15/18  15:50    


 


Eos #  0.3 K/mm3 (0.0-0.4)   03/15/18  15:50    


 


Baso #  0.1 K/mm3 (0.0-0.1)   03/15/18  15:50    


 


Seg Neutrophils %  75.9 % (40.0-70.0)  H  03/15/18  15:50    


 


Seg Neutrophils #  8.7 K/mm3 (1.8-7.7)  H  03/15/18  15:50    


 


PT  12.8 Sec. (12.2-14.9)   03/15/18  15:50    


 


INR  0.92  (0.87-1.13)   03/15/18  15:50    


 


APTT  29.2 Sec. (24.2-36.6)   03/15/18  15:50    


 


Sodium  136 mmol/L (137-145)  L  03/17/18  03:31    


 


Potassium  4.1 mmol/L (3.6-5.0)   03/17/18  03:31    


 


Chloride  95.1 mmol/L ()  L  03/17/18  03:31    


 


Carbon Dioxide  29 mmol/L (22-30)   03/17/18  03:31    


 


Anion Gap  16 mmol/L  03/17/18  03:31    


 


BUN  12 mg/dL (7-17)   03/17/18  03:31    


 


Creatinine  0.6 mg/dL (0.7-1.2)  L  03/17/18  03:31    


 


Estimated GFR  > 60 ml/min  03/17/18  03:31    


 


BUN/Creatinine Ratio  20 %  03/17/18  03:31    


 


Glucose  94 mg/dL ()   03/17/18  03:31    


 


POC Glucose  144  ()  H  03/18/18  04:53    


 


Calcium  8.5 mg/dL (8.4-10.2)   03/17/18  03:31    


 


Total Bilirubin  0.30 mg/dL (0.1-1.2)   03/15/18  15:50    


 


Direct Bilirubin  < 0.2 mg/dL (0-0.2)   03/15/18  15:50    


 


Indirect Bilirubin  0.1 mg/dL  03/15/18  15:50    


 


AST  30 units/L (5-40)   03/15/18  15:50    


 


ALT  12 units/L (7-56)   03/15/18  15:50    


 


Alkaline Phosphatase  101 units/L ()   03/15/18  15:50    


 


Troponin T  0.036 ng/mL (0.00-0.029)  H D 03/15/18  22:53    


 


Total Protein  6.9 g/dL (6.3-8.2)   03/15/18  15:50    


 


Albumin  4.1 g/dL (3.9-5)   03/15/18  15:50    


 


Albumin/Globulin Ratio  1.5 %  03/15/18  15:50    


 


Triglycerides  62 mg/dL (2-149)   03/15/18  15:50    


 


Cholesterol  222 mg/dL ()  H  03/15/18  15:50    


 


LDL Cholesterol Direct  79 mg/dL ()   03/15/18  15:50    


 


HDL Cholesterol  153 mg/dL (40-59)  H  03/15/18  15:50    


 


Cholesterol/HDL Ratio  1.45 %  03/15/18  15:50    


 


Urine Color  Yellow  (Yellow)   03/15/18  21:11    


 


Urine Turbidity  Clear  (Clear)   03/15/18  21:11    


 


Urine pH  7.0  (5.0-7.0)   03/15/18  21:11    


 


Ur Specific Gravity  1.018  (1.003-1.030)   03/15/18  21:11    


 


Urine Protein  <15 mg/dl mg/dL (Negative)   03/15/18  21:11    


 


Urine Glucose (UA)  Neg mg/dL (Negative)   03/15/18  21:11    


 


Urine Ketones  Neg mg/dL (Negative)   03/15/18  21:11    


 


Urine Blood  Sm  (Negative)   03/15/18  21:11    


 


Urine Nitrite  Neg  (Negative)   03/15/18  21:11    


 


Urine Bilirubin  Neg  (Negative)   03/15/18  21:11    


 


Urine Urobilinogen  < 2.0 mg/dL (<2.0)   03/15/18  21:11    


 


Ur Leukocyte Esterase  Sm  (Negative)   03/15/18  21:11    


 


Urine WBC (Auto)  28.0 /HPF (0.0-6.0)  H  03/15/18  21:11    


 


Urine RBC (Auto)  5.0 /HPF (0.0-6.0)   03/15/18  21:11    


 


U Epithel Cells (Auto)  1.0 /HPF (0-13.0)   03/15/18  21:11    


 


Urine Bacteria (Auto)  2+ /HPF (Negative)   03/15/18  21:11    


 


Urine Mucus  Few /HPF  03/15/18  21:11

## 2019-01-17 NOTE — EMERGENCY DEPARTMENT REPORT
ED General Adult HPI





- General


Chief complaint: Altered Mental Status


Stated complaint: CARDIAC ISSUES


Time Seen by Provider: 01/17/19 19:59


Source: EMS


Mode of arrival: Stretcher


Limitations: No Limitations





- History of Present Illness


Initial comments: 


Patient is an 83-year-old female past history of Parkinson's disease who 

presents with nausea and vomiting.  History is standby patients daughter 

patient's daughter states that patient got into candy and she vomited I want her

to be evaluated just to see if anything was going on.  Patient is at her b

aseline mental status.  Further history is limited due to patient's condition.





Severity scale (0 -10): 0





- Related Data


                                Home Medications











 Medication  Instructions  Recorded  Confirmed  Last Taken


 


Atenolol/Chlorthalidone 1 each PO DAILY 03/15/18 03/15/18 Unknown





[Atenolol-Chlorthalidone 100-25]    


 


Carbidopa/Levodopa [Carbidopa-Levo 1 tab PO QID 03/15/18 03/15/18 Unknown





 mg Odt]    


 


Potassium Chloride [K-Tab ER] 10 meq PO DAILY 03/15/18 03/15/18 Unknown


 


Primidone [Mysoline] 125 mg PO BID 03/15/18 03/15/18 Unknown


 


Rasagiline Mesylate [Azilect] 1 mg PO QAM 03/15/18 03/15/18 Unknown








                                  Previous Rx's











 Medication  Instructions  Recorded  Last Taken  Type


 


Calcium Carb/Vit D3/Minerals 1 each PO BID  tablet 03/17/18 Unknown Rx





[Caltrate Plus]    











                                    Allergies











Allergy/AdvReac Type Severity Reaction Status Date / Time


 


No Known Allergies Allergy   Verified 03/15/18 22:53














ED Review of Systems


ROS: 


Stated complaint: CARDIAC ISSUES


Other details as noted in HPI





Comment: Unobtainable due to pts medical conditions


Endocrine: no symptoms reported





ED Past Medical Hx





- Past Medical History


Hx Hypertension: Yes


Hx HIV: No


Additional medical history: parkinsons





- Surgical History


Additional Surgical History: fibroid tumor removal.  hysterectomy





- Social History


Smoking Status: Never Smoker


Substance Use Type: None





- Medications


Home Medications: 


                                Home Medications











 Medication  Instructions  Recorded  Confirmed  Last Taken  Type


 


Atenolol/Chlorthalidone 1 each PO DAILY 03/15/18 03/15/18 Unknown History





[Atenolol-Chlorthalidone 100-25]     


 


Carbidopa/Levodopa [Carbidopa-Levo 1 tab PO QID 03/15/18 03/15/18 Unknown 

History





 mg Odt]     


 


Potassium Chloride [K-Tab ER] 10 meq PO DAILY 03/15/18 03/15/18 Unknown History


 


Primidone [Mysoline] 125 mg PO BID 03/15/18 03/15/18 Unknown History


 


Rasagiline Mesylate [Azilect] 1 mg PO QAM 03/15/18 03/15/18 Unknown History


 


Calcium Carb/Vit D3/Minerals 1 each PO BID  tablet 03/17/18  Unknown Rx





[Caltrate Plus]     














ED Physical Exam





- General


Limitations: No Limitations


General appearance: alert, in no apparent distress





- Head


Head exam: Present: atraumatic, normocephalic





- Eye


Eye exam: Present: normal appearance





- ENT


ENT exam: Present: mucous membranes moist





- Neck


Neck exam: Present: normal inspection





- Respiratory


Respiratory exam: Present: normal lung sounds bilaterally.  Absent: respiratory 

distress





- Cardiovascular


Cardiovascular Exam: Present: regular rate, normal rhythm.  Absent: systolic 

murmur, diastolic murmur, rubs, gallop





- GI/Abdominal


GI/Abdominal exam: Present: soft, normal bowel sounds





- Extremities Exam


Extremities exam: Present: normal inspection





- Back Exam


Back exam: Present: normal inspection





- Neurological Exam


Neurological exam: Present: alert, other (rigid and  tremor )





- Psychiatric


Psychiatric exam: Present: other (parkinson's )





- Skin


Skin exam: Present: warm, dry, intact, normal color.  Absent: rash





ED Course





                                   Vital Signs











  01/17/19 01/17/19 01/17/19





  19:01 19:17 20:01


 


Temperature 98.1 F 97.8 F 


 


Pulse Rate 44 L 44 L 49 L


 


Respiratory 14 13 13





Rate   


 


Blood Pressure 140/46  124/62


 


Blood Pressure  138/95 





[Left]   


 


O2 Sat by Pulse 96 100 100





Oximetry   














  01/17/19





  21:01


 


Temperature 


 


Pulse Rate 53 L


 


Respiratory 14





Rate 


 


Blood Pressure 152/95


 


Blood Pressure 





[Left] 


 


O2 Sat by Pulse 94





Oximetry 














ED Medical Decision Making





- Lab Data


Result diagrams: 


                                 01/17/19 20:12





                                 01/17/19 20:12








                                   Lab Results











  01/17/19 01/17/19 Range/Units





  20:12 20:12 


 


WBC  8.9   (4.5-11.0)  K/mm3


 


RBC  4.32   (3.65-5.03)  M/mm3


 


Hgb  12.6   (10.1-14.3)  gm/dl


 


Hct  38.4   (30.3-42.9)  %


 


MCV  89   (79-97)  fl


 


MCH  29   (28-32)  pg


 


MCHC  33   (30-34)  %


 


RDW  15.0   (13.2-15.2)  %


 


Plt Count  280   (140-440)  K/mm3


 


Lymph % (Auto)  13.9   (13.4-35.0)  %


 


Mono % (Auto)  5.4   (0.0-7.3)  %


 


Eos % (Auto)  4.0   (0.0-4.3)  %


 


Baso % (Auto)  0.6   (0.0-1.8)  %


 


Lymph #  1.2   (1.2-5.4)  K/mm3


 


Mono #  0.5   (0.0-0.8)  K/mm3


 


Eos #  0.4   (0.0-0.4)  K/mm3


 


Baso #  0.1   (0.0-0.1)  K/mm3


 


Seg Neutrophils %  76.1 H   (40.0-70.0)  %


 


Seg Neutrophils #  6.8   (1.8-7.7)  K/mm3


 


Sodium   140  (137-145)  mmol/L


 


Potassium   4.3  (3.6-5.0)  mmol/L


 


Chloride   101.3  ()  mmol/L


 


Carbon Dioxide   29  (22-30)  mmol/L


 


Anion Gap   14  mmol/L


 


BUN   10  (7-17)  mg/dL


 


Creatinine   0.5 L  (0.7-1.2)  mg/dL


 


Estimated GFR   > 60  ml/min


 


BUN/Creatinine Ratio   20  %


 


Glucose   98  ()  mg/dL


 


Calcium   9.0  (8.4-10.2)  mg/dL


 


Total Bilirubin   0.30  (0.1-1.2)  mg/dL


 


AST   12  (5-40)  units/L


 


ALT   < 5 L  (7-56)  units/L


 


Alkaline Phosphatase   92  ()  units/L


 


Troponin T   < 0.010  (0.00-0.029)  ng/mL


 


Total Protein   7.4  (6.3-8.2)  g/dL


 


Albumin   4.0  (3.9-5)  g/dL


 


Albumin/Globulin Ratio   1.2  %














- EKG Data


-: EKG Interpreted by Me





- EKG Data





01/17/19 23:21


EKG shows junctional rhythm nonspecific interventricular conduction delay no ST 

segment elevation or T-wave inversion.





- Radiology Data


Radiology results: report reviewed





Chest x-ray: Shows no acute cardiopulmonary disease





- Medical Decision Making





Chief medical diagnosis: Nausea and vomiting due to unspecifed cause





Differential medical diagnosis: Parkinson's disease, arrhythmia and a large 

right abnormality





I'll give patient Zofran, EKG, CBC BMP and troponin





Since lab work is unremarkable I will discharge patient home discussed with 

patient's caretakers they agree with plan


Critical care attestation.: 


If time is entered above; I have spent that time in minutes in the direct care 

of this critically ill patient, excluding procedure time.








ED Disposition


Clinical Impression: 


 Parkinson's disease





Nausea and vomiting


Qualifiers:


 Vomiting type: unspecified Vomiting Intractability: unspecified Qualified 

Code(s): R11.2 - Nausea with vomiting, unspecified





Disposition: DC-01 TO HOME OR SELFCARE


Is pt being admited?: No


Does the pt Need Aspirin: No


Condition: Stable


Instructions:  Parkinson's Disease (ED)


Referrals: 


ALF ACOSTA MD [Primary Care Provider] - 3-5 Days

## 2019-01-17 NOTE — XRAY REPORT
FINAL REPORT



PROCEDURE:  XR CHEST 1V AP



TECHNIQUE:  Chest radiograph anteroposterior view. CPT 47409







HISTORY:  nausea 



COMPARISON:  No prior studies are available for comparison.



FINDINGS:  

Limited study due to suboptimal positioning. Patient is rotated to the left. 



Heart: Normal.



Mediastinum/Vessels: Normal.



Lungs/Pleural space: There is mild degree prominence of interstitial markings. There are no confluent
 infiltrates or mass lesions. Pleural spaces are clear...



Bony thorax: Bones are osteopenic. Old healed fracture deformities are noted involving left ribs..



Life support devices: None.



IMPRESSION:  

Limited study.



A prominent interstitial markings most likely represent interstitial fibrosis versus interstitial debo
ma



A two view chest study is recommended whenever the patient's condition permits..

## 2019-12-18 ENCOUNTER — HOSPITAL ENCOUNTER (EMERGENCY)
Dept: HOSPITAL 5 - ED | Age: 84
Discharge: HOME | End: 2019-12-18
Payer: MEDICARE

## 2019-12-18 VITALS — SYSTOLIC BLOOD PRESSURE: 132 MMHG | DIASTOLIC BLOOD PRESSURE: 66 MMHG

## 2019-12-18 DIAGNOSIS — E87.6: ICD-10-CM

## 2019-12-18 DIAGNOSIS — Z98.890: ICD-10-CM

## 2019-12-18 DIAGNOSIS — G20: ICD-10-CM

## 2019-12-18 DIAGNOSIS — Z90.710: ICD-10-CM

## 2019-12-18 DIAGNOSIS — Z79.899: ICD-10-CM

## 2019-12-18 DIAGNOSIS — I10: ICD-10-CM

## 2019-12-18 DIAGNOSIS — R19.7: Primary | ICD-10-CM

## 2019-12-18 LAB
ALBUMIN SERPL-MCNC: 3.9 G/DL (ref 3.9–5)
ALT SERPL-CCNC: 29 UNITS/L (ref 7–56)
BACTERIA #/AREA URNS HPF: (no result) /HPF
BASOPHILS # (AUTO): 0.1 K/MM3 (ref 0–0.1)
BASOPHILS NFR BLD AUTO: 0.7 % (ref 0–1.8)
BILIRUB UR QL STRIP: (no result)
BLOOD UR QL VISUAL: (no result)
BUN SERPL-MCNC: 13 MG/DL (ref 7–17)
BUN/CREAT SERPL: 26 %
CALCIUM SERPL-MCNC: 8.7 MG/DL (ref 8.4–10.2)
EOSINOPHIL # BLD AUTO: 0.4 K/MM3 (ref 0–0.4)
EOSINOPHIL NFR BLD AUTO: 4.7 % (ref 0–4.3)
HCT VFR BLD CALC: 39.6 % (ref 30.3–42.9)
HEMOLYSIS INDEX: 6
HGB BLD-MCNC: 13.3 GM/DL (ref 10.1–14.3)
LYMPHOCYTES # BLD AUTO: 1.4 K/MM3 (ref 1.2–5.4)
LYMPHOCYTES NFR BLD AUTO: 17.7 % (ref 13.4–35)
MCHC RBC AUTO-ENTMCNC: 34 % (ref 30–34)
MCV RBC AUTO: 86 FL (ref 79–97)
MONOCYTES # (AUTO): 0.8 K/MM3 (ref 0–0.8)
MONOCYTES % (AUTO): 9.7 % (ref 0–7.3)
MUCOUS THREADS #/AREA URNS HPF: (no result) /HPF
PH UR STRIP: 5 [PH] (ref 5–7)
PLATELET # BLD: 374 K/MM3 (ref 140–440)
RBC # BLD AUTO: 4.62 M/MM3 (ref 3.65–5.03)
RBC #/AREA URNS HPF: 1 /HPF (ref 0–6)
UROBILINOGEN UR-MCNC: < 2 MG/DL (ref ?–2)
WBC #/AREA URNS HPF: 2 /HPF (ref 0–6)

## 2019-12-18 PROCEDURE — 81001 URINALYSIS AUTO W/SCOPE: CPT

## 2019-12-18 PROCEDURE — 85025 COMPLETE CBC W/AUTO DIFF WBC: CPT

## 2019-12-18 PROCEDURE — 93005 ELECTROCARDIOGRAM TRACING: CPT

## 2019-12-18 PROCEDURE — 93010 ELECTROCARDIOGRAM REPORT: CPT

## 2019-12-18 PROCEDURE — 74177 CT ABD & PELVIS W/CONTRAST: CPT

## 2019-12-18 PROCEDURE — 87086 URINE CULTURE/COLONY COUNT: CPT

## 2019-12-18 PROCEDURE — 96361 HYDRATE IV INFUSION ADD-ON: CPT

## 2019-12-18 PROCEDURE — 84484 ASSAY OF TROPONIN QUANT: CPT

## 2019-12-18 PROCEDURE — 36415 COLL VENOUS BLD VENIPUNCTURE: CPT

## 2019-12-18 PROCEDURE — 96365 THER/PROPH/DIAG IV INF INIT: CPT

## 2019-12-18 PROCEDURE — 99285 EMERGENCY DEPT VISIT HI MDM: CPT

## 2019-12-18 PROCEDURE — 80053 COMPREHEN METABOLIC PANEL: CPT

## 2019-12-18 NOTE — EMERGENCY DEPARTMENT REPORT
ED Abdominal Pain HPI





- General


Chief Complaint: Nausea/Vomiting/Diarrhea


Stated Complaint: DIARRHEA


Time Seen by Provider: 12/18/19 08:12


Source: patient, EMS


Mode of arrival: Stretcher


Limitations: Physical Limitation





- History of Present Illness


Initial Comments: 





Patient is an 84-year-old -American female who comes to the ER with her 

family today due to diarrhea for 10 days.  She has not seen her primary care 

doctor.  The diarrhea is watery in nature and worse after she eats.  She lives 

with her nephew.  She has vomited this am. patient denies abdominal pain or back

pain.  He denies any fever or chills or complaints of pain.  However, patient 

has underlying Parkinson's dementia.  Patient is well kept and looks younger 

than her stated age.  Despite her comorbidities looks relatively healthy.  She 

had an episode of diarrhea similar to this 6 months ago and the nephew states 

that he thinks she came here for treatment and he does not recall what was 

causing it or what was done to stop it.





Past medical history 


Parkinson's dementia


Hypertension





Home meds


levodopa


atenolol


tylenol


premidone


rasagiline





PSH


hysterectomy





-: Gradual, days(s)


Improves With: nothing


Worsens With: nothing


Associated Symptoms: denies other symptoms





- Related Data


                                Home Medications











 Medication  Instructions  Recorded  Confirmed  Last Taken


 


Atenolol/Chlorthalidone 1 each PO DAILY 03/15/18 03/15/18 Unknown





[Atenolol-Chlorthalidone 100-25]    


 


Carbidopa/Levodopa [Carbidopa-Levo 1 tab PO QID 03/15/18 03/15/18 Unknown





 mg Odt]    


 


Potassium Chloride [K-Tab ER] 10 meq PO DAILY 03/15/18 03/15/18 Unknown


 


Primidone [Mysoline] 125 mg PO BID 03/15/18 03/15/18 Unknown


 


Rasagiline Mesylate [Azilect] 1 mg PO QAM 03/15/18 03/15/18 Unknown








                                  Previous Rx's











 Medication  Instructions  Recorded  Last Taken  Type


 


Calcium Carb/Vit D3/Minerals 1 each PO BID  tablet 03/17/18 Unknown Rx





[Caltrate Plus]    











                                    Allergies











Allergy/AdvReac Type Severity Reaction Status Date / Time


 


No Known Allergies Allergy   Verified 03/15/18 22:53














ED Review of Systems


ROS: 


Stated complaint: DIARRHEA


Other details as noted in HPI





Comment: All other systems reviewed and negative





ED Past Medical Hx





- Past Medical History


Previous Medical History?: Yes


Hx Hypertension: Yes


Hx HIV: No


Additional medical history: parkinsons





- Surgical History


Past Surgical History?: Yes


Additional Surgical History: fibroid tumor removal.  hysterectomy





- Social History


Smoking Status: Never Smoker


Substance Use Type: None





- Medications


Home Medications: 


                                Home Medications











 Medication  Instructions  Recorded  Confirmed  Last Taken  Type


 


Atenolol/Chlorthalidone 1 each PO DAILY 03/15/18 03/15/18 Unknown History





[Atenolol-Chlorthalidone 100-25]     


 


Carbidopa/Levodopa [Carbidopa-Levo 1 tab PO QID 03/15/18 03/15/18 Unknown 

History





 mg Odt]     


 


Potassium Chloride [K-Tab ER] 10 meq PO DAILY 03/15/18 03/15/18 Unknown History


 


Primidone [Mysoline] 125 mg PO BID 03/15/18 03/15/18 Unknown History


 


Rasagiline Mesylate [Azilect] 1 mg PO QAM 03/15/18 03/15/18 Unknown History


 


Calcium Carb/Vit D3/Minerals 1 each PO BID  tablet 03/17/18  Unknown Rx





[Caltrate Plus]     














ED Physical Exam





- General


Limitations: Physical Limitation


General appearance: alert, in no apparent distress





- Head


Head exam: Present: atraumatic, normocephalic





- Eye


Eye exam: Present: normal appearance





- ENT


ENT exam: Present: mucous membranes dry





- Neck


Neck exam: Present: normal inspection





- Respiratory


Respiratory exam: Present: normal lung sounds bilaterally.  Absent: respiratory 

distress





- Cardiovascular


Cardiovascular Exam: Present: regular rate, normal rhythm.  Absent: systolic 

murmur, diastolic murmur, rubs, gallop





- GI/Abdominal


GI/Abdominal exam: Present: soft, normal bowel sounds





- Rectal


Rectal exam: Present: deferred





- 


External exam: Present: normal external exam





- Extremities Exam


Extremities exam: Present: normal inspection





- Back Exam


Back exam: Present: normal inspection





- Neurological Exam


Neurological exam: Present: alert





- Psychiatric


Psychiatric exam: Present: flat affect





- Skin


Skin exam: Present: warm, dry, intact, normal color.  Absent: rash





ED Course


                                   Vital Signs











  12/18/19 12/18/19 12/18/19





  07:52 11:02 11:52


 


Temperature 97.8 F  


 


Pulse Rate 83 87 76


 


Respiratory 18 20 16





Rate   


 


Blood Pressure 136/79  


 


Blood Pressure  140/70 122/61





[Left]   


 


O2 Sat by Pulse 96 96 96





Oximetry   














ED Medical Decision Making





- Lab Data


Result diagrams: 


                                 12/18/19 08:19





                                 12/18/19 08:19





- Radiology Data


Radiology results: report reviewed, image reviewed





- Medical Decision Making








Labs











  12/18/19 12/18/19 12/18/19





  08:19 08:19 08:48


 


WBC  7.8  


 


RBC  4.62  


 


Hgb  13.3  


 


Hct  39.6  


 


MCV  86  


 


MCH  29  


 


MCHC  34  


 


RDW  15.0  


 


Plt Count  374  


 


Lymph % (Auto)  17.7  


 


Mono % (Auto)  9.7 H  


 


Eos % (Auto)  4.7 H  


 


Baso % (Auto)  0.7  


 


Lymph #  1.4  


 


Mono #  0.8  


 


Eos #  0.4  


 


Baso #  0.1  


 


Seg Neutrophils %  67.2  


 


Seg Neutrophils #  5.2  


 


Sodium   140 


 


Potassium   3.2 L 


 


Chloride   102.7 


 


Carbon Dioxide   18 L 


 


Anion Gap   23 


 


BUN   13 


 


Creatinine   0.5 L 


 


Estimated GFR   > 60 


 


BUN/Creatinine Ratio   26 


 


Glucose   95 


 


Calcium   8.7 


 


Total Bilirubin   0.20 


 


AST   23 


 


ALT   29 


 


Alkaline Phosphatase   112 


 


Troponin T   < 0.010 


 


Total Protein   7.8 


 


Albumin   3.9 


 


Albumin/Globulin Ratio   1.0 


 


Urine Color    Yellow


 


Urine Turbidity    Slightly-cloudy


 


Urine pH    5.0


 


Ur Specific Gravity    1.026


 


Urine Protein    30 mg/dl


 


Urine Glucose (UA)    Neg


 


Urine Ketones    Neg


 


Urine Blood    Sm


 


Urine Nitrite    Neg


 


Urine Bilirubin    Neg


 


Urine Urobilinogen    < 2.0


 


Ur Leukocyte Esterase    Neg


 


Urine WBC (Auto)    2.0


 


Urine RBC (Auto)    1.0


 


U Epithel Cells (Auto)    3.0


 


Urine Bacteria (Auto)    1+


 


Urine Mucus    Few











                                   Vital Signs











  12/18/19





  07:52


 


Temperature 97.8 F


 


Pulse Rate 83


 


Respiratory 18





Rate 


 


Blood Pressure 136/79


 


O2 Sat by Pulse 96





Oximetry 








labs noted


k replaced


2GM Mg IV





ua noted





IV NS provided





taking po





CT neg for acute process





No vomiting in ER





Will dc home with family with pcp and GI follow up. Pts neuro status and 

behavior is baseline for pt given her dementia





- Differential Diagnosis


ro diverticular disease/ ro uti


Critical care attestation.: 


If time is entered above; I have spent that time in minutes in the direct care 

of this critically ill patient, excluding procedure time.








ED Disposition


Clinical Impression: 


 Parkinson's disease, Diarrhea, Hypokalemia





Disposition: DC-01 TO HOME OR SELFCARE


Is pt being admited?: No


Does the pt Need Aspirin: No


Condition: Stable


Instructions:  Acute Diarrhea (ED), Chronic Diarrhea (ED)


Additional Instructions: 


follow up with pcp


a new referral below





follow up with GI MD if persists


referral below





CT normal today





advance diet slowly


banana-applesauce-rice-toast


stay well hydrated





continue home meds





Referrals: 


SAMUEL RUELAS MD [Staff Physician] - 3-5 Days


MATEO MENDIETA MD [Staff Physician] - 3-5 Days


Time of Disposition: 10:33

## 2019-12-18 NOTE — CAT SCAN REPORT
CT ABDOMEN AND PELVIS WITH IV CONTRAST



INDICATION:

DIARRHEA AND ABD PAIN.



COMPARISON:

None available.



TECHNIQUE:

Axial CT images were obtained through the abdomen and pelvis after 100 mL. IV contrast. All CT scans 
at this location are performed using CT dose reduction for ALARA by means of automated exposure contr
ol. 



FINDINGS -- ABDOMEN:

Lung Bases: No acute abnormality.

Liver: Multiple cysts identified throughout the liver..

Gallbladder: Normal.  Bile Ducts: Normal.

Pancreas: Normal.

Spleen: Normal.

Adrenals: Normal.

Right Kidney and Proximal Ureter: Normal.

Left Kidney and Proximal Ureter: Normal.

Stomach and Bowel: Moderate type I hiatal hernia..

Lymph Nodes: No significant adenopathy.

Aorta: Mild atherosclerotic disease of a nondilated abdominal aorta..  IVC: Normal.

Additional Findings: None.



FINDINGS -- PELVIS:

Urinary Bladder and Distal Ureters: Normal.

Reproductive Organs: No acute abnormality.

Appendix: Normal.  Bowel: No acute abnormality. Sigmoid diverticulosis without diverticulitis.

Free Fluid: None.

Lymph Nodes: No significant adenopathy.

Additional Findings: None.



Skeletal System: No acute abnormality.



IMPRESSION:

1. No acute process in the abdomen or pelvis. Multiple simple appearing cysts throughout the liver wi
th sigmoid diverticulosis but no diverticulitis. No bowel obstruction



Signer Name: Bruno Huitron MD 

Signed: 12/18/2019 11:20 AM

 Workstation Name: [email or web address]

## 2021-05-05 ENCOUNTER — HOSPITAL ENCOUNTER (EMERGENCY)
Dept: HOSPITAL 5 - ED | Age: 86
LOS: 1 days | Discharge: HOME | End: 2021-05-06
Payer: MEDICARE

## 2021-05-05 DIAGNOSIS — R11.2: ICD-10-CM

## 2021-05-05 DIAGNOSIS — I10: ICD-10-CM

## 2021-05-05 DIAGNOSIS — R91.8: ICD-10-CM

## 2021-05-05 DIAGNOSIS — N39.0: Primary | ICD-10-CM

## 2021-05-05 DIAGNOSIS — Z90.710: ICD-10-CM

## 2021-05-05 DIAGNOSIS — Z98.890: ICD-10-CM

## 2021-05-05 DIAGNOSIS — Z79.899: ICD-10-CM

## 2021-05-05 LAB
ALBUMIN SERPL-MCNC: 3.7 G/DL (ref 3.9–5)
ALT SERPL-CCNC: 6 UNITS/L (ref 7–56)
BACTERIA #/AREA URNS HPF: (no result) /HPF
BASOPHILS # (AUTO): 0 K/MM3 (ref 0–0.1)
BASOPHILS NFR BLD AUTO: 0.6 % (ref 0–1.8)
BILIRUB UR QL STRIP: (no result)
BLOOD UR QL VISUAL: (no result)
BUN SERPL-MCNC: 9 MG/DL (ref 7–17)
BUN/CREAT SERPL: 18 %
CALCIUM SERPL-MCNC: 8.7 MG/DL (ref 8.4–10.2)
EOSINOPHIL # BLD AUTO: 0.2 K/MM3 (ref 0–0.4)
EOSINOPHIL NFR BLD AUTO: 2.5 % (ref 0–4.3)
HCT VFR BLD CALC: 37.7 % (ref 30.3–42.9)
HEMOLYSIS INDEX: 7
HGB BLD-MCNC: 12.7 GM/DL (ref 10.1–14.3)
LYMPHOCYTES # BLD AUTO: 1.1 K/MM3 (ref 1.2–5.4)
LYMPHOCYTES NFR BLD AUTO: 17.2 % (ref 13.4–35)
MCHC RBC AUTO-ENTMCNC: 34 % (ref 30–34)
MCV RBC AUTO: 88 FL (ref 79–97)
MONOCYTES # (AUTO): 0.4 K/MM3 (ref 0–0.8)
MONOCYTES % (AUTO): 6.4 % (ref 0–7.3)
MUCOUS THREADS #/AREA URNS HPF: (no result) /HPF
PH UR STRIP: 6 [PH] (ref 5–7)
PLATELET # BLD: 290 K/MM3 (ref 140–440)
PROT UR STRIP-MCNC: (no result) MG/DL
RBC # BLD AUTO: 4.26 M/MM3 (ref 3.65–5.03)
RBC #/AREA URNS HPF: 3 /HPF (ref 0–6)
UROBILINOGEN UR-MCNC: 2 MG/DL (ref ?–2)
WBC #/AREA URNS HPF: 55 /HPF (ref 0–6)

## 2021-05-05 PROCEDURE — 99284 EMERGENCY DEPT VISIT MOD MDM: CPT

## 2021-05-05 PROCEDURE — 93005 ELECTROCARDIOGRAM TRACING: CPT

## 2021-05-05 PROCEDURE — 87040 BLOOD CULTURE FOR BACTERIA: CPT

## 2021-05-05 PROCEDURE — 85025 COMPLETE CBC W/AUTO DIFF WBC: CPT

## 2021-05-05 PROCEDURE — 36415 COLL VENOUS BLD VENIPUNCTURE: CPT

## 2021-05-05 PROCEDURE — 81001 URINALYSIS AUTO W/SCOPE: CPT

## 2021-05-05 PROCEDURE — 87076 CULTURE ANAEROBE IDENT EACH: CPT

## 2021-05-05 PROCEDURE — 84484 ASSAY OF TROPONIN QUANT: CPT

## 2021-05-05 PROCEDURE — 83690 ASSAY OF LIPASE: CPT

## 2021-05-05 PROCEDURE — 87186 SC STD MICRODIL/AGAR DIL: CPT

## 2021-05-05 PROCEDURE — 80053 COMPREHEN METABOLIC PANEL: CPT

## 2021-05-05 PROCEDURE — 74176 CT ABD & PELVIS W/O CONTRAST: CPT

## 2021-05-05 PROCEDURE — 87086 URINE CULTURE/COLONY COUNT: CPT

## 2021-05-05 PROCEDURE — 96374 THER/PROPH/DIAG INJ IV PUSH: CPT

## 2021-05-05 NOTE — EMERGENCY DEPARTMENT REPORT
ED General Adult HPI





- General


Chief complaint: Nausea/Vomiting/Diarrhea


Stated complaint: WEAKNESS/CARDIAC ISSUE


Time Seen by Provider: 05/05/21 19:15


Source: patient, EMS


Mode of arrival: Stretcher


Limitations: Physical Limitation





- History of Present Illness


Initial comments: 





Patient presents to the emergency department the chief complaint of nausea and 

vomiting after taking a pill this morning.  She states the pill made her sick.  

Patient complains of generalized weakness and states she has not been feeling 

well since she took the pill this morning.  Patient complains of abdominal 

cramping but denies huber abdominal pain.  Patient denies chest pain, shortness 

breath, or headache.


-: Sudden


Radiation: abdomen


Severity scale (0 -10): 1


Quality: other (Cramping)


Consistency: constant


Improves with: none


Worsens with: none


Associated Symptoms: denies other symptoms


Treatments Prior to Arrival: none





- Related Data


                                Home Medications











 Medication  Instructions  Recorded  Confirmed  Last Taken


 


Atenolol/Chlorthalidone 1 each PO DAILY 03/15/18 03/15/18 Unknown





[Atenolol-Chlorthalidone 100-25]    


 


Carbidopa/Levodopa [Carbidopa-Levo 1 tab PO QID 03/15/18 03/15/18 Unknown





 mg Odt]    


 


Potassium Chloride [K-Tab ER] 10 meq PO DAILY 03/15/18 03/15/18 Unknown


 


Primidone [Mysoline] 125 mg PO BID 03/15/18 03/15/18 Unknown


 


Rasagiline Mesylate [Azilect] 1 mg PO QAM 03/15/18 03/15/18 Unknown








                                  Previous Rx's











 Medication  Instructions  Recorded  Last Taken  Type


 


Calcium Carb/Vit D3/Minerals 1 each PO BID  tablet 03/17/18 Unknown Rx





[Caltrate Plus]    


 


cephALEXin [Keflex] 500 mg PO BID #14 capsule 05/05/21 Unknown Rx











                                    Allergies











Allergy/AdvReac Type Severity Reaction Status Date / Time


 


No Known Allergies Allergy   Verified 03/15/18 22:53














ED Review of Systems


ROS: 


Stated complaint: WEAKNESS/CARDIAC ISSUE


Other details as noted in HPI





Comment: All other systems reviewed and negative


Constitutional: denies: chills, fever


Eyes: denies: eye pain, eye discharge, vision change


ENT: denies: ear pain, throat pain


Respiratory: denies: cough, shortness of breath, wheezing


Cardiovascular: denies: chest pain, palpitations


Endocrine: no symptoms reported


Gastrointestinal: nausea, vomiting.  denies: abdominal pain, diarrhea


Genitourinary: denies: urgency, dysuria, discharge


Musculoskeletal: denies: back pain, joint swelling, arthralgia


Skin: denies: rash, lesions


Neurological: denies: headache, weakness, paresthesias


Psychiatric: denies: anxiety, depression


Hematological/Lymphatic: denies: easy bleeding, easy bruising





ED Past Medical Hx





- Past Medical History


Hx Hypertension: Yes


Hx HIV: No


Additional medical history: parkinsons





- Surgical History


Additional Surgical History: fibroid tumor removal.  hysterectomy





- Social History


Smoking Status: Never Smoker





- Medications


Home Medications: 


                                Home Medications











 Medication  Instructions  Recorded  Confirmed  Last Taken  Type


 


Atenolol/Chlorthalidone 1 each PO DAILY 03/15/18 03/15/18 Unknown History





[Atenolol-Chlorthalidone 100-25]     


 


Carbidopa/Levodopa [Carbidopa-Levo 1 tab PO QID 03/15/18 03/15/18 Unknown 

History





 mg Odt]     


 


Potassium Chloride [K-Tab ER] 10 meq PO DAILY 03/15/18 03/15/18 Unknown History


 


Primidone [Mysoline] 125 mg PO BID 03/15/18 03/15/18 Unknown History


 


Rasagiline Mesylate [Azilect] 1 mg PO QAM 03/15/18 03/15/18 Unknown History


 


Calcium Carb/Vit D3/Minerals 1 each PO BID  tablet 03/17/18  Unknown Rx





[Caltrate Plus]     


 


cephALEXin [Keflex] 500 mg PO BID #14 capsule 05/05/21  Unknown Rx














ED Physical Exam





- General


Limitations: Physical Limitation


General appearance: alert, in no apparent distress





- Head


Head exam: Present: atraumatic, normocephalic





- Eye


Eye exam: Present: normal appearance





- ENT


ENT exam: Present: mucous membranes moist





- Neck


Neck exam: Present: normal inspection





- Respiratory


Respiratory exam: Present: normal lung sounds bilaterally.  Absent: respiratory 

distress





- Cardiovascular


Cardiovascular Exam: Present: regular rate, normal rhythm.  Absent: systolic 

murmur, diastolic murmur, rubs, gallop





- GI/Abdominal


GI/Abdominal exam: Present: soft, normal bowel sounds.  Absent: distended, 

tenderness





- Extremities Exam


Extremities exam: Present: normal inspection





- Back Exam


Back exam: Present: normal inspection





- Neurological Exam


Neurological exam: Present: alert, oriented X3, CN II-XII intact, other 

(Essential tremors).  Absent: motor sensory deficit





- Psychiatric


Psychiatric exam: Present: normal affect, normal mood





- Skin


Skin exam: Present: warm, dry, intact, normal color.  Absent: rash





ED Course


                                   Vital Signs











  05/05/21 05/05/21 05/05/21





  19:30 19:45 20:01


 


Pulse Rate 53 L 51 L 56 L


 


Respiratory 14 17 15





Rate   


 


Blood Pressure 116/67 112/56 127/73


 


O2 Sat by Pulse 97 96 96





Oximetry   














  05/05/21 05/05/21 05/05/21





  20:15 20:31 21:30


 


Pulse Rate 57 L 56 L 63


 


Respiratory 22 10 L 13





Rate   


 


Blood Pressure 127/73 111/76 139/49


 


O2 Sat by Pulse 97 97 93





Oximetry   














  05/05/21 05/05/21 05/05/21





  21:45 22:00 22:15


 


Pulse Rate 57 L 65 65


 


Respiratory 19 12 14





Rate   


 


Blood Pressure 103/52 112/51 123/60


 


O2 Sat by Pulse 95 94 92





Oximetry   














  05/05/21 05/05/21 05/05/21





  22:30 22:45 23:00


 


Pulse Rate 67 68 61


 


Respiratory 22 22 18





Rate   


 


Blood Pressure 121/53 112/61 99/59


 


O2 Sat by Pulse 95 94 94





Oximetry   














ED Medical Decision Making





- Lab Data


Result diagrams: 


                                 05/05/21 19:51





                                 05/05/21 19:51








                                   Lab Results











  05/05/21 05/05/21 05/05/21 Range/Units





  19:51 19:51 19:51 


 


WBC  6.7    (4.5-11.0)  K/mm3


 


RBC  4.26    (3.65-5.03)  M/mm3


 


Hgb  12.7    (10.1-14.3)  gm/dl


 


Hct  37.7    (30.3-42.9)  %


 


MCV  88    (79-97)  fl


 


MCH  30    (28-32)  pg


 


MCHC  34    (30-34)  %


 


RDW  14.7    (13.2-15.2)  %


 


Plt Count  290    (140-440)  K/mm3


 


Lymph % (Auto)  17.2    (13.4-35.0)  %


 


Mono % (Auto)  6.4    (0.0-7.3)  %


 


Eos % (Auto)  2.5    (0.0-4.3)  %


 


Baso % (Auto)  0.6    (0.0-1.8)  %


 


Lymph # (Auto)  1.1 L    (1.2-5.4)  K/mm3


 


Mono # (Auto)  0.4    (0.0-0.8)  K/mm3


 


Eos # (Auto)  0.2    (0.0-0.4)  K/mm3


 


Baso # (Auto)  0.0    (0.0-0.1)  K/mm3


 


Seg Neutrophils %  73.3 H    (40.0-70.0)  %


 


Seg Neutrophils #  4.9    (1.8-7.7)  K/mm3


 


Sodium   136 L   (137-145)  mmol/L


 


Potassium   4.6   (3.6-5.0)  mmol/L


 


Chloride   100.8   ()  mmol/L


 


Carbon Dioxide   25   (22-30)  mmol/L


 


Anion Gap   15   mmol/L


 


BUN   9   (7-17)  mg/dL


 


Creatinine   0.5 L   (0.6-1.2)  mg/dL


 


Estimated GFR   > 60   ml/min


 


BUN/Creatinine Ratio   18   %


 


Glucose   118 H   ()  mg/dL


 


Calcium   8.7   (8.4-10.2)  mg/dL


 


Total Bilirubin   0.20   (0.1-1.2)  mg/dL


 


AST   12   (5-40)  units/L


 


ALT   6 L   (7-56)  units/L


 


Alkaline Phosphatase   111   ()  units/L


 


Troponin T   < 0.010   (0.00-0.029)  ng/mL


 


Total Protein   7.1   (6.3-8.2)  g/dL


 


Albumin   3.7 L   (3.9-5)  g/dL


 


Albumin/Globulin Ratio   1.1   %


 


Lipase    11 L  (13-60)  units/L


 


Urine Color     (Yellow)  


 


Urine Turbidity     (Clear)  


 


Urine pH     (5.0-7.0)  


 


Ur Specific Gravity     (1.003-1.030)  


 


Urine Protein     (Negative)  mg/dL


 


Urine Glucose (UA)     (Negative)  mg/dL


 


Urine Ketones     (Negative)  mg/dL


 


Urine Blood     (Negative)  


 


Urine Nitrite     (Negative)  


 


Urine Bilirubin     (Negative)  


 


Urine Urobilinogen     (<2.0)  mg/dL


 


Ur Leukocyte Esterase     (Negative)  


 


Urine WBC (Auto)     (0.0-6.0)  /HPF


 


Urine RBC (Auto)     (0.0-6.0)  /HPF


 


U Epithel Cells (Auto)     (0-13.0)  /HPF


 


Urine Bacteria (Auto)     (Negative)  /HPF


 


Urine Mucus     /HPF


 


Urine Yeast (Budding)     /HPF














  05/05/21 Range/Units





  22:15 


 


WBC   (4.5-11.0)  K/mm3


 


RBC   (3.65-5.03)  M/mm3


 


Hgb   (10.1-14.3)  gm/dl


 


Hct   (30.3-42.9)  %


 


MCV   (79-97)  fl


 


MCH   (28-32)  pg


 


MCHC   (30-34)  %


 


RDW   (13.2-15.2)  %


 


Plt Count   (140-440)  K/mm3


 


Lymph % (Auto)   (13.4-35.0)  %


 


Mono % (Auto)   (0.0-7.3)  %


 


Eos % (Auto)   (0.0-4.3)  %


 


Baso % (Auto)   (0.0-1.8)  %


 


Lymph # (Auto)   (1.2-5.4)  K/mm3


 


Mono # (Auto)   (0.0-0.8)  K/mm3


 


Eos # (Auto)   (0.0-0.4)  K/mm3


 


Baso # (Auto)   (0.0-0.1)  K/mm3


 


Seg Neutrophils %   (40.0-70.0)  %


 


Seg Neutrophils #   (1.8-7.7)  K/mm3


 


Sodium   (137-145)  mmol/L


 


Potassium   (3.6-5.0)  mmol/L


 


Chloride   ()  mmol/L


 


Carbon Dioxide   (22-30)  mmol/L


 


Anion Gap   mmol/L


 


BUN   (7-17)  mg/dL


 


Creatinine   (0.6-1.2)  mg/dL


 


Estimated GFR   ml/min


 


BUN/Creatinine Ratio   %


 


Glucose   ()  mg/dL


 


Calcium   (8.4-10.2)  mg/dL


 


Total Bilirubin   (0.1-1.2)  mg/dL


 


AST   (5-40)  units/L


 


ALT   (7-56)  units/L


 


Alkaline Phosphatase   ()  units/L


 


Troponin T   (0.00-0.029)  ng/mL


 


Total Protein   (6.3-8.2)  g/dL


 


Albumin   (3.9-5)  g/dL


 


Albumin/Globulin Ratio   %


 


Lipase   (13-60)  units/L


 


Urine Color  Yellow  (Yellow)  


 


Urine Turbidity  Clear  (Clear)  


 


Urine pH  6.0  (5.0-7.0)  


 


Ur Specific Gravity  1.018  (1.003-1.030)  


 


Urine Protein  <15 mg/dl  (Negative)  mg/dL


 


Urine Glucose (UA)  Neg  (Negative)  mg/dL


 


Urine Ketones  Tr  (Negative)  mg/dL


 


Urine Blood  Neg  (Negative)  


 


Urine Nitrite  Pos  (Negative)  


 


Urine Bilirubin  Neg  (Negative)  


 


Urine Urobilinogen  2.0  (<2.0)  mg/dL


 


Ur Leukocyte Esterase  Mod  (Negative)  


 


Urine WBC (Auto)  55.0 H  (0.0-6.0)  /HPF


 


Urine RBC (Auto)  3.0  (0.0-6.0)  /HPF


 


U Epithel Cells (Auto)  1.0  (0-13.0)  /HPF


 


Urine Bacteria (Auto)  3+  (Negative)  /HPF


 


Urine Mucus  Few  /HPF


 


Urine Yeast (Budding)  Few  /HPF














- EKG Data


When compared to previous EKG there are: other (EKG has a lot of artifact 

secondary to movement but there is no ST elevation on EKG)





- Radiology Data


Radiology results: report reviewed





- Medical Decision Making





Results reviewed


Patient given p.o. antibiotics for UTI


Critical care attestation.: 


If time is entered above; I have spent that time in minutes in the direct care 

of this critically ill patient, excluding procedure time.








ED Disposition


Clinical Impression: 


 UTI (urinary tract infection), Nausea & vomiting, Lung mass





Disposition: DC-01 TO HOME OR SELFCARE


Is pt being admited?: No


Does the pt Need Aspirin: No


Condition: Stable


Instructions:  Nausea and Vomiting, Adult, Urinary Tract Infection, Adult


Additional Instructions: 


Please have a repeat CT specific for your chest within 3 months for further 

evaluation of lung nodules that were present on prior studies


Referrals: 


PRIMARY CARE,MD [Primary Care Provider] - 3-5 Days


SAMUEL RUELAS MD [Staff Physician] - 3-5 Days


Time of Disposition: 23:20

## 2021-05-05 NOTE — CAT SCAN REPORT
CT ABDOMEN AND PELVIS WITHOUT CONTRAST



INDICATION / CLINICAL INFORMATION:

nausea/vomiting.



TECHNIQUE:

Axial CT images were obtained through the abdomen and pelvis without IV contrast.  All CT scans at [facility] location are performed using CT dose reduction for ALARA by means of automated exposure control. 



COMPARISON:

12/18/2019



FINDINGS:



LOWER CHEST: Mild aortic valve calcification. Mild multivessel coronary artery atherosclerotic calcif
ication. Mild scarring at the lung bases. Unchanged 6 mm pulmonary nodule in the left upper lobe. 5 m
m subpleural nodule in the left lower lobe.

HEPATOBILIARY: Multiple hepatic hypodensities measuring up to 3.8 cm in the right hepatic lobe withou
t significant change when compared to 12/18/2019. Gallbladder is filled with sludge. No biliary ducta
l dilatation.

PANCREAS/SPLEEN/ADRENALS: No significant abnormality.

GENITOURINARY: 1 cm hyperdense cyst at the right interpolar region. Subcentimeter fat density focus a
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 1.5 cm simple cyst is present at the left or 
polar region. No obstructive uropathy. Bladder and ureters demonstrate no significant abnormality.

GASTROINTESTINAL/MESENTERY: No evidence of acute appendicitis. No bowel obstruction or inflammation i
s visualized. A small hiatal hernia is present. No free air or significant free fluid.

RETROPERITONEUM: No significant adenopathy.

REPRODUCTIVE ORGANS: No significant abnormality.



VASCULAR: Mild atherosclerotic calcification without acute abnormality. 

BODY WALL: No significant abnormality.

SKELETAL SYSTEM: No significant abnormality.



IMPRESSION:

1. No acute abdominopelvic abnormality.

2. Unchanged 6 mm pulmonary nodule in the left upper lobe and 5 mm subpleural nodule in the left lowe
r lobe. Consider follow-up evaluation in 6 months with dedicated CT chest.

3. 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

4. Unchanged hepatic cysts.

5. Additional findings as above.



Signer Name: Suraj Wilson MD 

Signed: 5/5/2021 9:38 PM

Workstation Name: [id number]

## 2021-05-06 VITALS — SYSTOLIC BLOOD PRESSURE: 123 MMHG | DIASTOLIC BLOOD PRESSURE: 64 MMHG

## 2021-05-19 NOTE — ELECTROCARDIOGRAPH REPORT
[facility]

                                       

Test Date:    2021               Test Time:    17:55:00

Pat Name:     DEMETRIO OJEDA             Department:   

Patient ID:   [id number]          Room:          

Gender:       F                        Technician:   NITESH

:          [id number]               Requested By: PRIYA ROSA

Order Number: [id number]              Reading MD:   Luan Huber

                                 Measurements

Intervals                              Axis          

Rate:         136                      P:            

NJ:                                    QRS:          -23

QRSD:         83                       T:            227

QT:           240                                    

QTc:          362                                    

                           Interpretive Statements

baseline artifact ?Atrial fibrillation vs nsr

repeat ekg

No previous ECG available for comparison

Electronically Signed On 2021 10:46:06 EDT by Luan Huber